# Patient Record
Sex: FEMALE | Race: OTHER | NOT HISPANIC OR LATINO
[De-identification: names, ages, dates, MRNs, and addresses within clinical notes are randomized per-mention and may not be internally consistent; named-entity substitution may affect disease eponyms.]

---

## 2019-07-25 ENCOUNTER — RECORD ABSTRACTING (OUTPATIENT)
Age: 84
End: 2019-07-25

## 2019-07-25 DIAGNOSIS — C18.9 MALIGNANT NEOPLASM OF COLON, UNSPECIFIED: ICD-10-CM

## 2019-07-25 DIAGNOSIS — I63.9 CEREBRAL INFARCTION, UNSPECIFIED: ICD-10-CM

## 2019-07-25 DIAGNOSIS — K21.9 GASTRO-ESOPHAGEAL REFLUX DISEASE W/OUT ESOPHAGITIS: ICD-10-CM

## 2019-07-25 DIAGNOSIS — E66.9 OBESITY, UNSPECIFIED: ICD-10-CM

## 2019-08-02 ENCOUNTER — APPOINTMENT (OUTPATIENT)
Dept: CARDIOLOGY | Facility: CLINIC | Age: 84
End: 2019-08-02
Payer: MEDICARE

## 2019-08-02 ENCOUNTER — NON-APPOINTMENT (OUTPATIENT)
Age: 84
End: 2019-08-02

## 2019-08-02 VITALS
DIASTOLIC BLOOD PRESSURE: 73 MMHG | HEIGHT: 60 IN | OXYGEN SATURATION: 97 % | BODY MASS INDEX: 21.64 KG/M2 | RESPIRATION RATE: 14 BRPM | HEART RATE: 61 BPM | SYSTOLIC BLOOD PRESSURE: 151 MMHG | WEIGHT: 110.23 LBS

## 2019-08-02 DIAGNOSIS — I21.4 NON-ST ELEVATION (NSTEMI) MYOCARDIAL INFARCTION: ICD-10-CM

## 2019-08-02 DIAGNOSIS — I50.30 UNSPECIFIED DIASTOLIC (CONGESTIVE) HEART FAILURE: ICD-10-CM

## 2019-08-02 DIAGNOSIS — Z86.79 PERSONAL HISTORY OF OTHER DISEASES OF THE CIRCULATORY SYSTEM: ICD-10-CM

## 2019-08-02 DIAGNOSIS — Z87.448 PERSONAL HISTORY OF OTHER DISEASES OF URINARY SYSTEM: ICD-10-CM

## 2019-08-02 DIAGNOSIS — I10 ESSENTIAL (PRIMARY) HYPERTENSION: ICD-10-CM

## 2019-08-02 PROCEDURE — 99204 OFFICE O/P NEW MOD 45 MIN: CPT

## 2019-08-02 PROCEDURE — 93000 ELECTROCARDIOGRAM COMPLETE: CPT

## 2019-08-02 RX ORDER — CALCITRIOL 0.25 UG/1
0.25 CAPSULE, LIQUID FILLED ORAL
Refills: 0 | Status: ACTIVE | COMMUNITY
Start: 2019-08-02

## 2019-08-02 RX ORDER — GLIPIZIDE 2.5 MG/1
2.5 TABLET, FILM COATED, EXTENDED RELEASE ORAL
Refills: 0 | Status: ACTIVE | COMMUNITY
Start: 2019-08-02

## 2019-08-02 RX ORDER — SODIUM BICARBONATE 650 MG/1
650 TABLET ORAL 3 TIMES DAILY
Refills: 0 | Status: ACTIVE | COMMUNITY
Start: 2019-08-02

## 2019-08-02 RX ORDER — CLOPIDOGREL 75 MG/1
75 TABLET, FILM COATED ORAL
Refills: 0 | Status: ACTIVE | COMMUNITY
Start: 2019-08-02

## 2019-08-02 RX ORDER — AMLODIPINE BESYLATE 5 MG/1
5 TABLET ORAL
Refills: 0 | Status: ACTIVE | COMMUNITY
Start: 2019-08-02

## 2019-08-02 RX ORDER — METOPROLOL SUCCINATE 25 MG/1
25 TABLET, EXTENDED RELEASE ORAL DAILY
Refills: 3 | Status: ACTIVE | COMMUNITY
Start: 2019-08-02

## 2019-08-02 RX ORDER — FUROSEMIDE 40 MG/1
40 TABLET ORAL
Qty: 30 | Refills: 0 | Status: ACTIVE | COMMUNITY
Start: 2019-08-02

## 2019-08-02 RX ORDER — ROSUVASTATIN CALCIUM 5 MG/1
5 TABLET, FILM COATED ORAL DAILY
Qty: 30 | Refills: 3 | Status: ACTIVE | COMMUNITY
Start: 2019-08-02

## 2019-08-02 RX ORDER — CALCIUM ACETATE 667 MG/1
667 CAPSULE ORAL
Qty: 90 | Refills: 0 | Status: ACTIVE | COMMUNITY
Start: 2019-08-02

## 2019-08-02 NOTE — HISTORY OF PRESENT ILLNESS
[FreeTextEntry1] : 87 year old woman, accompanied by her daughter, who comes in to establish care. Was hospitalized in May 2019 at [facility] for NSTEMI, Tn was high and down trended, medically managed. Had TTE at that time that showed preserved EF. Started on HD and now has permacath in left chest wall. 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 No complaints\par Sees EP doctor regularly but needed general cards followup

## 2019-08-02 NOTE — PHYSICAL EXAM
[General Appearance - Well Developed] : well developed [Normal Appearance] : normal appearance [Well Groomed] : well groomed [General Appearance - Well Nourished] : well nourished [No Deformities] : no deformities [General Appearance - In No Acute Distress] : no acute distress [Normal Conjunctiva] : the conjunctiva exhibited no abnormalities [Eyelids - No Xanthelasma] : the eyelids demonstrated no xanthelasmas [Normal Oral Mucosa] : normal oral mucosa [No Oral Pallor] : no oral pallor [No Oral Cyanosis] : no oral cyanosis [Normal Jugular Venous A Waves Present] : normal jugular venous A waves present [Normal Jugular Venous V Waves Present] : normal jugular venous V waves present [No Jugular Venous Vivar A Waves] : no jugular venous vivar A waves [Heart Rate And Rhythm] : heart rate and rhythm were normal [Heart Sounds] : normal S1 and S2 [Murmurs] : no murmurs present [Respiration, Rhythm And Depth] : normal respiratory rhythm and effort [Exaggerated Use Of Accessory Muscles For Inspiration] : no accessory muscle use [Auscultation Breath Sounds / Voice Sounds] : lungs were clear to auscultation bilaterally [Abdomen Soft] : soft [Abdomen Tenderness] : non-tender [Abdomen Mass (___ Cm)] : no abdominal mass palpated [Abnormal Walk] : normal gait [Gait - Sufficient For Exercise Testing] : the gait was sufficient for exercise testing [Skin Color & Pigmentation] : normal skin color and pigmentation [] : no rash [No Venous Stasis] : no venous stasis [Skin Lesions] : no skin lesions [No Skin Ulcers] : no skin ulcer [No Xanthoma] : no  xanthoma was observed

## 2019-08-02 NOTE — REASON FOR VISIT
[Initial Evaluation] : an initial evaluation of [FreeTextEntry2] : HFpEF (EF 50-55%), HTN HLD HD NSTEMI in May PPM in place

## 2019-08-02 NOTE — DISCUSSION/SUMMARY
[FreeTextEntry1] : 87 year old woman with history HTN HLD recent NSTEMI and HFpEF here to establish care. No complaints\par EKG reviewed and normal\par 1. HFpEF- COnt Lasix daily, on Toprol 25 mg daily. CHD per renal\par Continue present meds\par 2. HTN- On Norvasc 5 mg daily and Toprol\par 3. HLD- Crestor, continue present meds\par 4. FU in 6 months

## 2020-02-07 ENCOUNTER — APPOINTMENT (OUTPATIENT)
Dept: CARDIOLOGY | Facility: CLINIC | Age: 85
End: 2020-02-07

## 2020-11-16 ENCOUNTER — INPATIENT (INPATIENT)
Facility: HOSPITAL | Age: 85
LOS: 7 days | Discharge: HOME HEALTH | End: 2020-11-24
Attending: STUDENT IN AN ORGANIZED HEALTH CARE EDUCATION/TRAINING PROGRAM | Admitting: STUDENT IN AN ORGANIZED HEALTH CARE EDUCATION/TRAINING PROGRAM
Payer: MEDICARE

## 2020-11-16 VITALS
DIASTOLIC BLOOD PRESSURE: 61 MMHG | SYSTOLIC BLOOD PRESSURE: 130 MMHG | HEART RATE: 61 BPM | TEMPERATURE: 98 F | HEIGHT: 61 IN | RESPIRATION RATE: 18 BRPM | OXYGEN SATURATION: 100 %

## 2020-11-16 DIAGNOSIS — Z95.0 PRESENCE OF CARDIAC PACEMAKER: ICD-10-CM

## 2020-11-16 DIAGNOSIS — E87.2 ACIDOSIS: ICD-10-CM

## 2020-11-16 DIAGNOSIS — E87.5 HYPERKALEMIA: ICD-10-CM

## 2020-11-16 DIAGNOSIS — Z90.49 ACQUIRED ABSENCE OF OTHER SPECIFIED PARTS OF DIGESTIVE TRACT: Chronic | ICD-10-CM

## 2020-11-16 DIAGNOSIS — I10 ESSENTIAL (PRIMARY) HYPERTENSION: ICD-10-CM

## 2020-11-16 DIAGNOSIS — Z02.9 ENCOUNTER FOR ADMINISTRATIVE EXAMINATIONS, UNSPECIFIED: ICD-10-CM

## 2020-11-16 DIAGNOSIS — N25.0 RENAL OSTEODYSTROPHY: ICD-10-CM

## 2020-11-16 DIAGNOSIS — N18.6 END STAGE RENAL DISEASE: ICD-10-CM

## 2020-11-16 DIAGNOSIS — D64.9 ANEMIA, UNSPECIFIED: ICD-10-CM

## 2020-11-16 DIAGNOSIS — Z29.9 ENCOUNTER FOR PROPHYLACTIC MEASURES, UNSPECIFIED: ICD-10-CM

## 2020-11-16 DIAGNOSIS — Z95.0 PRESENCE OF CARDIAC PACEMAKER: Chronic | ICD-10-CM

## 2020-11-16 DIAGNOSIS — R19.7 DIARRHEA, UNSPECIFIED: ICD-10-CM

## 2020-11-16 LAB
ALBUMIN SERPL ELPH-MCNC: 4.6 G/DL — SIGNIFICANT CHANGE UP (ref 3.3–5)
ALP SERPL-CCNC: 88 U/L — SIGNIFICANT CHANGE UP (ref 40–120)
ALT FLD-CCNC: 66 U/L — HIGH (ref 4–33)
ANION GAP SERPL CALC-SCNC: 15 MMO/L — HIGH (ref 7–14)
ANION GAP SERPL CALC-SCNC: 15 MMO/L — HIGH (ref 7–14)
ANION GAP SERPL CALC-SCNC: 17 MMO/L — HIGH (ref 7–14)
AST SERPL-CCNC: 41 U/L — HIGH (ref 4–32)
BASE EXCESS BLDV CALC-SCNC: -14.1 MMOL/L — SIGNIFICANT CHANGE UP
BASE EXCESS BLDV CALC-SCNC: -14.6 MMOL/L — SIGNIFICANT CHANGE UP
BASOPHILS # BLD AUTO: 0.03 K/UL — SIGNIFICANT CHANGE UP (ref 0–0.2)
BASOPHILS NFR BLD AUTO: 0.8 % — SIGNIFICANT CHANGE UP (ref 0–2)
BILIRUB SERPL-MCNC: 0.4 MG/DL — SIGNIFICANT CHANGE UP (ref 0.2–1.2)
BLD GP AB SCN SERPL QL: NEGATIVE — SIGNIFICANT CHANGE UP
BLOOD GAS VENOUS - CREATININE: 7.81 MG/DL — HIGH (ref 0.5–1.3)
BLOOD GAS VENOUS - CREATININE: SIGNIFICANT CHANGE UP MG/DL (ref 0.5–1.3)
BUN SERPL-MCNC: 104 MG/DL — HIGH (ref 7–23)
BUN SERPL-MCNC: 98 MG/DL — HIGH (ref 7–23)
BUN SERPL-MCNC: 99 MG/DL — HIGH (ref 7–23)
CALCIUM SERPL-MCNC: 8.7 MG/DL — SIGNIFICANT CHANGE UP (ref 8.4–10.5)
CALCIUM SERPL-MCNC: 8.7 MG/DL — SIGNIFICANT CHANGE UP (ref 8.4–10.5)
CALCIUM SERPL-MCNC: 9 MG/DL — SIGNIFICANT CHANGE UP (ref 8.4–10.5)
CHLORIDE BLDV-SCNC: 114 MMOL/L — HIGH (ref 96–108)
CHLORIDE BLDV-SCNC: 118 MMOL/L — HIGH (ref 96–108)
CHLORIDE SERPL-SCNC: 109 MMOL/L — HIGH (ref 98–107)
CHLORIDE SERPL-SCNC: 113 MMOL/L — HIGH (ref 98–107)
CHLORIDE SERPL-SCNC: 113 MMOL/L — HIGH (ref 98–107)
CO2 SERPL-SCNC: 12 MMOL/L — LOW (ref 22–31)
CO2 SERPL-SCNC: 13 MMOL/L — LOW (ref 22–31)
CO2 SERPL-SCNC: 13 MMOL/L — LOW (ref 22–31)
CREAT SERPL-MCNC: 7.29 MG/DL — HIGH (ref 0.5–1.3)
CREAT SERPL-MCNC: 7.55 MG/DL — HIGH (ref 0.5–1.3)
CREAT SERPL-MCNC: 7.98 MG/DL — HIGH (ref 0.5–1.3)
EOSINOPHIL # BLD AUTO: 0.05 K/UL — SIGNIFICANT CHANGE UP (ref 0–0.5)
EOSINOPHIL NFR BLD AUTO: 1.3 % — SIGNIFICANT CHANGE UP (ref 0–6)
GAS PNL BLDV: 140 MMOL/L — SIGNIFICANT CHANGE UP (ref 136–146)
GAS PNL BLDV: 141 MMOL/L — SIGNIFICANT CHANGE UP (ref 136–146)
GLUCOSE BLDC GLUCOMTR-MCNC: 94 MG/DL — SIGNIFICANT CHANGE UP (ref 70–99)
GLUCOSE BLDV-MCNC: 132 MG/DL — HIGH (ref 70–99)
GLUCOSE BLDV-MCNC: 96 MG/DL — SIGNIFICANT CHANGE UP (ref 70–99)
GLUCOSE SERPL-MCNC: 101 MG/DL — HIGH (ref 70–99)
GLUCOSE SERPL-MCNC: 105 MG/DL — HIGH (ref 70–99)
GLUCOSE SERPL-MCNC: 135 MG/DL — HIGH (ref 70–99)
HBV SURFACE AG SER-ACNC: NEGATIVE — SIGNIFICANT CHANGE UP
HCO3 BLDV-SCNC: 12 MMOL/L — LOW (ref 20–27)
HCO3 BLDV-SCNC: 13 MMOL/L — LOW (ref 20–27)
HCT VFR BLD CALC: 27.4 % — LOW (ref 34.5–45)
HCT VFR BLDV CALC: 25.5 % — LOW (ref 34.5–45)
HCT VFR BLDV CALC: 30.6 % — LOW (ref 34.5–45)
HGB BLD-MCNC: 9.2 G/DL — LOW (ref 11.5–15.5)
HGB BLDV-MCNC: 8.2 G/DL — LOW (ref 11.5–15.5)
HGB BLDV-MCNC: 9.9 G/DL — LOW (ref 11.5–15.5)
IMM GRANULOCYTES NFR BLD AUTO: 0.3 % — SIGNIFICANT CHANGE UP (ref 0–1.5)
LACTATE BLDV-MCNC: 1.5 MMOL/L — SIGNIFICANT CHANGE UP (ref 0.5–2)
LACTATE BLDV-MCNC: 1.6 MMOL/L — SIGNIFICANT CHANGE UP (ref 0.5–2)
LIDOCAIN IGE QN: 48.2 U/L — SIGNIFICANT CHANGE UP (ref 7–60)
LYMPHOCYTES # BLD AUTO: 0.78 K/UL — LOW (ref 1–3.3)
LYMPHOCYTES # BLD AUTO: 19.5 % — SIGNIFICANT CHANGE UP (ref 13–44)
MAGNESIUM SERPL-MCNC: 2.5 MG/DL — SIGNIFICANT CHANGE UP (ref 1.6–2.6)
MCHC RBC-ENTMCNC: 32.2 PG — SIGNIFICANT CHANGE UP (ref 27–34)
MCHC RBC-ENTMCNC: 33.6 % — SIGNIFICANT CHANGE UP (ref 32–36)
MCV RBC AUTO: 95.8 FL — SIGNIFICANT CHANGE UP (ref 80–100)
MONOCYTES # BLD AUTO: 0.32 K/UL — SIGNIFICANT CHANGE UP (ref 0–0.9)
MONOCYTES NFR BLD AUTO: 8 % — SIGNIFICANT CHANGE UP (ref 2–14)
NEUTROPHILS # BLD AUTO: 2.8 K/UL — SIGNIFICANT CHANGE UP (ref 1.8–7.4)
NEUTROPHILS NFR BLD AUTO: 70.1 % — SIGNIFICANT CHANGE UP (ref 43–77)
NRBC # FLD: 0 K/UL — SIGNIFICANT CHANGE UP (ref 0–0)
PCO2 BLDV: 35 MMHG — LOW (ref 41–51)
PCO2 BLDV: 36 MMHG — LOW (ref 41–51)
PH BLDV: 7.17 PH — CRITICAL LOW (ref 7.32–7.43)
PH BLDV: 7.18 PH — CRITICAL LOW (ref 7.32–7.43)
PHOSPHATE SERPL-MCNC: 6.4 MG/DL — HIGH (ref 2.5–4.5)
PLATELET # BLD AUTO: 117 K/UL — LOW (ref 150–400)
PMV BLD: 13 FL — SIGNIFICANT CHANGE UP (ref 7–13)
PO2 BLDV: 23 MMHG — LOW (ref 35–40)
PO2 BLDV: 25 MMHG — LOW (ref 35–40)
POTASSIUM BLDV-SCNC: 5.1 MMOL/L — HIGH (ref 3.4–4.5)
POTASSIUM BLDV-SCNC: 6.4 MMOL/L — CRITICAL HIGH (ref 3.4–4.5)
POTASSIUM SERPL-MCNC: 5.4 MMOL/L — HIGH (ref 3.5–5.3)
POTASSIUM SERPL-MCNC: 5.4 MMOL/L — HIGH (ref 3.5–5.3)
POTASSIUM SERPL-MCNC: 6.4 MMOL/L — CRITICAL HIGH (ref 3.5–5.3)
POTASSIUM SERPL-SCNC: 5.4 MMOL/L — HIGH (ref 3.5–5.3)
POTASSIUM SERPL-SCNC: 5.4 MMOL/L — HIGH (ref 3.5–5.3)
POTASSIUM SERPL-SCNC: 6.4 MMOL/L — CRITICAL HIGH (ref 3.5–5.3)
PROT SERPL-MCNC: 6.8 G/DL — SIGNIFICANT CHANGE UP (ref 6–8.3)
RBC # BLD: 2.86 M/UL — LOW (ref 3.8–5.2)
RBC # FLD: 13.4 % — SIGNIFICANT CHANGE UP (ref 10.3–14.5)
RH IG SCN BLD-IMP: POSITIVE — SIGNIFICANT CHANGE UP
SAO2 % BLDV: 28.7 % — LOW (ref 60–85)
SAO2 % BLDV: 31.4 % — LOW (ref 60–85)
SARS-COV-2 RNA SPEC QL NAA+PROBE: SIGNIFICANT CHANGE UP
SODIUM SERPL-SCNC: 139 MMOL/L — SIGNIFICANT CHANGE UP (ref 135–145)
SODIUM SERPL-SCNC: 140 MMOL/L — SIGNIFICANT CHANGE UP (ref 135–145)
SODIUM SERPL-SCNC: 141 MMOL/L — SIGNIFICANT CHANGE UP (ref 135–145)
WBC # BLD: 3.99 K/UL — SIGNIFICANT CHANGE UP (ref 3.8–10.5)
WBC # FLD AUTO: 3.99 K/UL — SIGNIFICANT CHANGE UP (ref 3.8–10.5)

## 2020-11-16 PROCEDURE — 71045 X-RAY EXAM CHEST 1 VIEW: CPT | Mod: 26

## 2020-11-16 PROCEDURE — 36410 VNPNXR 3YR/> PHY/QHP DX/THER: CPT

## 2020-11-16 PROCEDURE — 99285 EMERGENCY DEPT VISIT HI MDM: CPT

## 2020-11-16 PROCEDURE — 76937 US GUIDE VASCULAR ACCESS: CPT | Mod: 26

## 2020-11-16 RX ORDER — DEXTROSE 50 % IN WATER 50 %
50 SYRINGE (ML) INTRAVENOUS ONCE
Refills: 0 | Status: COMPLETED | OUTPATIENT
Start: 2020-11-16 | End: 2020-11-16

## 2020-11-16 RX ORDER — CHLORHEXIDINE GLUCONATE 213 G/1000ML
1 SOLUTION TOPICAL DAILY
Refills: 0 | Status: DISCONTINUED | OUTPATIENT
Start: 2020-11-16 | End: 2020-11-24

## 2020-11-16 RX ORDER — METOPROLOL TARTRATE 50 MG
25 TABLET ORAL DAILY
Refills: 0 | Status: DISCONTINUED | OUTPATIENT
Start: 2020-11-16 | End: 2020-11-19

## 2020-11-16 RX ORDER — ATORVASTATIN CALCIUM 80 MG/1
20 TABLET, FILM COATED ORAL AT BEDTIME
Refills: 0 | Status: DISCONTINUED | OUTPATIENT
Start: 2020-11-16 | End: 2020-11-24

## 2020-11-16 RX ORDER — HEPARIN SODIUM 5000 [USP'U]/ML
5000 INJECTION INTRAVENOUS; SUBCUTANEOUS EVERY 12 HOURS
Refills: 0 | Status: DISCONTINUED | OUTPATIENT
Start: 2020-11-16 | End: 2020-11-17

## 2020-11-16 RX ORDER — CALCIUM GLUCONATE 100 MG/ML
2 VIAL (ML) INTRAVENOUS ONCE
Refills: 0 | Status: COMPLETED | OUTPATIENT
Start: 2020-11-16 | End: 2020-11-16

## 2020-11-16 RX ORDER — ROSUVASTATIN CALCIUM 5 MG/1
1 TABLET ORAL
Qty: 0 | Refills: 0 | DISCHARGE

## 2020-11-16 RX ORDER — SODIUM CHLORIDE 9 MG/ML
3 INJECTION INTRAMUSCULAR; INTRAVENOUS; SUBCUTANEOUS EVERY 8 HOURS
Refills: 0 | Status: DISCONTINUED | OUTPATIENT
Start: 2020-11-16 | End: 2020-11-24

## 2020-11-16 RX ORDER — SODIUM CHLORIDE 9 MG/ML
1000 INJECTION INTRAMUSCULAR; INTRAVENOUS; SUBCUTANEOUS ONCE
Refills: 0 | Status: COMPLETED | OUTPATIENT
Start: 2020-11-16 | End: 2020-11-16

## 2020-11-16 RX ORDER — INSULIN HUMAN 100 [IU]/ML
5 INJECTION, SOLUTION SUBCUTANEOUS ONCE
Refills: 0 | Status: COMPLETED | OUTPATIENT
Start: 2020-11-16 | End: 2020-11-16

## 2020-11-16 RX ORDER — SODIUM ZIRCONIUM CYCLOSILICATE 10 G/10G
10 POWDER, FOR SUSPENSION ORAL ONCE
Refills: 0 | Status: COMPLETED | OUTPATIENT
Start: 2020-11-16 | End: 2020-11-16

## 2020-11-16 RX ORDER — ONDANSETRON 8 MG/1
4 TABLET, FILM COATED ORAL ONCE
Refills: 0 | Status: COMPLETED | OUTPATIENT
Start: 2020-11-16 | End: 2020-11-16

## 2020-11-16 RX ORDER — CALCIUM ACETATE 667 MG
667 TABLET ORAL
Refills: 0 | Status: DISCONTINUED | OUTPATIENT
Start: 2020-11-16 | End: 2020-11-24

## 2020-11-16 RX ADMIN — INSULIN HUMAN 5 UNIT(S): 100 INJECTION, SOLUTION SUBCUTANEOUS at 16:47

## 2020-11-16 RX ADMIN — ONDANSETRON 4 MILLIGRAM(S): 8 TABLET, FILM COATED ORAL at 15:20

## 2020-11-16 RX ADMIN — Medication 200 GRAM(S): at 15:23

## 2020-11-16 RX ADMIN — Medication 50 MILLILITER(S): at 16:47

## 2020-11-16 RX ADMIN — SODIUM CHLORIDE 1000 MILLILITER(S): 9 INJECTION INTRAMUSCULAR; INTRAVENOUS; SUBCUTANEOUS at 15:20

## 2020-11-16 RX ADMIN — SODIUM ZIRCONIUM CYCLOSILICATE 10 GRAM(S): 10 POWDER, FOR SUSPENSION ORAL at 16:55

## 2020-11-16 NOTE — H&P ADULT - ASSESSMENT
89 y/o F with hx of HTN, colon ca s/p hemicolectomy, ESRD on HD via permacath, presents with nausea and vomiting and diarrhea x few days. admitted for missed HD, hyperkalemia

## 2020-11-16 NOTE — H&P ADULT - PROBLEM SELECTOR PLAN 4
check PTH  start phosphate binders with meals Alistair had loose BM/diarrhea outpatient. No further episodes. Monitor BM  If patient continues to have more episodes, then consider stool study, ova parasite Patient had loose BM/diarrhea outpatient. No further episodes. Monitor BM  If patient continues to have more episodes, then consider stool study, ova parasite  Lactose free diet

## 2020-11-16 NOTE — H&P ADULT - RS GEN PE MLT RESP DETAILS PC
breath sounds equal/respirations non-labored/airway patent/clear to auscultation bilaterally/good air movement good air movement/no chest wall tenderness/airway patent/clear to auscultation bilaterally/respirations non-labored/breath sounds equal

## 2020-11-16 NOTE — CONSULT NOTE ADULT - PROBLEM SELECTOR RECOMMENDATION 2
On admission K: 6.4 s/p medical management down to 5.4  will go for HD tonight.- continue to monitor BMP

## 2020-11-16 NOTE — H&P ADULT - PROBLEM SELECTOR PLAN 10
1.  Name of PCP: Dr. Bowen  2.  PCP Contacted on Admission: [ ] Y    [ ] N    3.  PCP contacted at Discharge: [ ] Y    [ ] N    [ ] N/A  4.  Post-Discharge Appointment Date and Location:  5.  Summary of Handoff given to PCP: 1.  Name of PCP:   Dr. Covarrubias (Renal)  Dr. Ethan Bowen (PMD)  2.  PCP Contacted on Admission: [ ] Y    [ ] N    3.  PCP contacted at Discharge: [ ] Y    [ ] N    [ ] N/A  4.  Post-Discharge Appointment Date and Location:  5.  Summary of Handoff given to PCP:

## 2020-11-16 NOTE — H&P ADULT - NSICDXPASTSURGICALHX_GEN_ALL_CORE_FT
PAST SURGICAL HISTORY:  Hx of hysterectomy      PAST SURGICAL HISTORY:  H/O hemicolectomy     Hx of hysterectomy     Pacemaker

## 2020-11-16 NOTE — H&P ADULT - NEGATIVE CARDIOVASCULAR SYMPTOMS
no palpitations/no chest pain/no dyspnea on exertion no palpitations/no chest pain/no peripheral edema/no dyspnea on exertion

## 2020-11-16 NOTE — H&P ADULT - ATTENDING COMMENTS
89 y/o female HX of HTN, Colon CA, S/P Hemicolectomy, ESRD on HD via Perma Cath, + PPM, High Cholesterol, Anemia, pt c/o + N/V, + diarrhea at home,  decreased po intake, last BM yesterday morning, no more diarrhea reported, no fever, pt missed HD as well x 2-3 weeks as per daughter, NO CP, NO  SOB, no Cough, no abdominal pain, NO HA, no dizziness, no dysuria, no cough, still making urine on PO Lasix at home, + hyperkalemia on admission , treated by ER, K+ 6.4--->5.4, S/P IV Ca Gluconate, D50 and regular Insulin , Lokelma  10 gm po x 1, pt was seen by Renal tonight, S/P HD tonight,     Vitals: Tem 98, HR 68, /60, RR 19, 100% RA, FS 94,    Na 140, BUN 98, creatinine 7.29, AG 15, HCO3=12, lactate 1.5, COVID 19 PCR  Neg., Hgb 9.2, WBC 3.99, Platelet 117,     Hold Lasix, will check with Renal in AM regarding when to start PO Lasix, PT consult, Renal f/u, fall/aspiration precaution, EP consult for PPM interrogation, DVT Prophylaxis: SQ Heparin, F/U CBC, Platelet 117, F/U BMP, CBC, Vit B12, Folate, Iron Studies, Ferritin, Hep A,B,C profile, Lactose free, Renal Diet, Start Phoslo, Serum PTH intact, UA, TSH, Free T4, HgbA1c, Fructosamine, fasting Lipid, on Lipitor, on Lopressor ER 25 mg QD,     Case D/W Pt and TELE PA,    pt was seen & evaluated  by me, Dr. JUAN Aguilar on 11/17/2020. 87 y/o female HX of HTN, Colon CA, S/P Hemicolectomy, ESRD on HD via Perma Cath, + PPM, High Cholesterol, Anemia, pt c/o + N/V, + diarrhea at home,  decreased po intake, last BM yesterday morning, no more diarrhea reported, no fever, pt missed HD as well x 2-3 weeks as per daughter, NO CP, NO  SOB, no Cough, no abdominal pain, NO HA, no dizziness, no dysuria, no cough, still making urine on PO Lasix at home, + hyperkalemia on admission , treated by ER, K+ 6.4--->5.4, S/P IV Ca Gluconate, D50 and regular Insulin , Lokelma  10 gm po x 1, pt was seen by Renal tonight, S/P HD tonight,     Vitals: Tem 98, HR 68, /60, RR 19, 100% RA, FS 94,    Na 140, BUN 98, creatinine 7.29, AG 15, HCO3=12, lactate 1.5, COVID 19 PCR  Neg., Hgb 9.2, WBC 3.99, Platelet 117,     Hold Lasix, will check with Renal in AM regarding when to start PO Lasix, PT consult, Renal f/u, fall/aspiration precaution, EP consult for PPM interrogation, DVT Prophylaxis: Venodyne,  F/U CBC, Platelet 117, F/U BMP, CBC, Vit B12, Folate, Iron Studies, Ferritin, Hep A,B,C profile, Lactose free, Renal Diet, Start Phoslo, Serum PTH intact, UA, TSH, Free T4, HgbA1c, Fructosamine, fasting Lipid, on Lipitor, on Lopressor ER 25 mg QD,     Case D/W Pt and TELE PA,    pt was seen & evaluated  by me, Dr. JUAN Aguilar on 11/17/2020.

## 2020-11-16 NOTE — ED ADULT NURSE NOTE - INTERVENTIONS DEFINITIONS
Stretcher in lowest position, wheels locked, appropriate side rails in place/Room bathroom lighting operational/Call bell, personal items and telephone within reach/Physically safe environment: no spills, clutter or unnecessary equipment/Non-slip footwear when patient is off stretcher

## 2020-11-16 NOTE — CONSULT NOTE ADULT - PROBLEM SELECTOR RECOMMENDATION 9
Pt. with ESRD on HD three times a week. Last HD was on 2-3 weeks ago via right tunneled cath. Unknown dialysis center -Pt. clinically stable. Labs reviewed and remarkable for hyperkalemia. Will arrange for maintenance HD today. Monitor labs. Patient consented for HD, placed on paper chart.

## 2020-11-16 NOTE — CONSULT NOTE ADULT - PROBLEM SELECTOR RECOMMENDATION 4
patient with HAGMA, CO2: 13 and pH: 7.1 - most likely 2/2 renal failure.   will schedule for HD tonight, continue to monitor.

## 2020-11-16 NOTE — ED PROVIDER NOTE - CARE PLAN
Principal Discharge DX:	Hyperkalemia  Secondary Diagnosis:	Hypertension, unspecified type  Secondary Diagnosis:	ESRD (end stage renal disease) on dialysis

## 2020-11-16 NOTE — ED PROVIDER NOTE - NS ED ROS FT
ROS:  GENERAL: No fever, no chills  EYES: no change in vision  HEENT: no trouble swallowing, no trouble speaking  CARDIAC: no chest pain  PULMONARY: no cough, no shortness of breath  GI: no abdominal pain, + nausea, + vomiting, + diarrhea, no constipation  : No dysuria, no frequency, no change in appearance, or odor of urine  SKIN: no rashes  NEURO: no headache, no weakness  MSK: No joint pain  ~Ramón Bates D.O. -Resident

## 2020-11-16 NOTE — H&P ADULT - NEGATIVE NEUROLOGICAL SYMPTOMS
no weakness/no transient paralysis no syncope/no vertigo/no transient paralysis/no headache/no confusion/no weakness/no tremors

## 2020-11-16 NOTE — CONSULT NOTE ADULT - PROBLEM SELECTOR RECOMMENDATION 6
Patient with hyperphosphatemia --please check PTH   Please start on phosphate binders with meals.  Low phosphorus diet.  Monitor serum phosphorus.      Discussed with attending on call Dr Grace

## 2020-11-16 NOTE — H&P ADULT - NEGATIVE ENMT SYMPTOMS
no tinnitus/no ear pain/no hearing difficulty no hearing difficulty/no vertigo/no dysphagia/no ear pain/no tinnitus/no nose bleeds

## 2020-11-16 NOTE — H&P ADULT - PROBLEM SELECTOR PLAN 6
cont metoprolol  dash diet Monitor H/H  no signs of active bleeding  Anemia panel  Renal to start DELILAH

## 2020-11-16 NOTE — ED PROVIDER NOTE - CLINICAL SUMMARY MEDICAL DECISION MAKING FREE TEXT BOX
87yo f pmhx ESRD on HD pw daughter for n/v/d for last few days. reports she has not had dialysis treatment x 3 weeks because she refuses to go and she doesn't care what will happen to her if she doesn't get hd. concern for missed hd and patient unwilling to undergo hd at this time. discussed with patient and her daughter richmond at length at bedside risks of not undergoing hd and the risk of dying without hd and she declined further hd at this time. would like to have labwork done and fluids, agreed with calcium and will discuss further pending results of initial eval. daughter would like mother to get hd and thinks she is making a mistake but understands her mother has clear decision making and has the right to make her own decisions about her healthcare at this time.

## 2020-11-16 NOTE — H&P ADULT - NEGATIVE RESPIRATORY AND THORAX SYMPTOMS
no dyspnea/no cough/no wheezing no dyspnea/no cough/no hemoptysis/no pleuritic chest pain/no wheezing

## 2020-11-16 NOTE — ED ADULT TRIAGE NOTE - CHIEF COMPLAINT QUOTE
pt with hx of ESRD on HD, c/o n/v/d. denies abd pain/cp/sob. states has not had dialysis treatment x 3 weeks because she refuses to go.

## 2020-11-16 NOTE — ED PROVIDER NOTE - PHYSICAL EXAMINATION
Physical Exam:  Gen: NAD, AOx3, non-toxic appearing  Head: normal appearing  HEENT: normal conjunctiva, oral mucosa dry  Lung:  no respiratory distress, speaking in full sentences, clear to ascultation bilaterally     CV: regular rate and rhythm   Abd: soft, ND, NT  MSK: no visible deformities  Neuro: No focal deficits  Skin: Warm  Psych: normal affect  ~Ramón Bates D.O. -Resident

## 2020-11-16 NOTE — ED PROCEDURE NOTE - ATTENDING CONTRIBUTION TO CARE
ED focused US guided PIV placement [id number].    Indication poor access.      Findings: compressible (    right   )  AC vein.  Using direct US guidance, under clean conditions, a long 20Ga peripheral catheter introduced into vessel.  US performed after procedure, catheter in vein, no complications.     Impression:  successful US guided ( right    ) AC PIV placement.   Dexeus [id number]

## 2020-11-16 NOTE — H&P ADULT - PROBLEM SELECTOR PLAN 9
1.  Name of PCP: Dr. Bowen  2.  PCP Contacted on Admission: [ ] Y    [ ] N    3.  PCP contacted at Discharge: [ ] Y    [ ] N    [ ] N/A  4.  Post-Discharge Appointment Date and Location:  5.  Summary of Handoff given to PCP: hep sc for VTE px Venodyne

## 2020-11-16 NOTE — ED PROVIDER NOTE - PMH
GERD (gastroesophageal reflux disease)    HTN - Hypertension    Malignant neoplasm of colon    Mini stroke1/2012    Obesity

## 2020-11-16 NOTE — ED ADULT NURSE NOTE - OBJECTIVE STATEMENT
Pt presents to rm 14, A&Ox4, ambulatory w/ walker at baseline, pmhx of ESRD on diaylsis via left sided permacath, here for evaluation of N/V/D x1wk. Pt has not received dialysis in 3wks- pt states "I will not receive dialysis ever again they hurt my arm and they are killing me when they do it." Denies chest pain, shortness of breath, palpitations, diaphoresis, headaches, fevers, dizziness, or urinary symptoms at this time. IV established in right arm with a 20G, unable to obtain labs- Dr. Butt aware- states they will place US IV and obtain labs. call bell in reach, warm blanket provided, bed in lowest position, side rails up x2, MD evaluation in progress, pt on telemetry. Will continue to monitor. Linda RN: Pt presents to rm 14, A&Ox4, ambulatory w/ walker at baseline, pmhx of ESRD on diaylsis via left sided permacath, here for evaluation of N/V/D x1wk. Pt has not received dialysis in 3wks- pt states "I will not receive dialysis ever again they hurt my arm and they are killing me when they do it." Denies chest pain, shortness of breath, palpitations, diaphoresis, headaches, fevers, dizziness, or urinary symptoms at this time. IV established in right arm with a 20G, unable to obtain labs- Dr. Butt aware- states they will place US IV and obtain labs. call bell in reach, warm blanket provided, bed in lowest position, side rails up x2, MD evaluation in progress, pt on telemetry. Report endorsed to primary RN Sruthi.

## 2020-11-16 NOTE — ED PROVIDER NOTE - PROGRESS NOTE DETAILS
discussed with patient, now would like hd at this time. discussed with nephrology fellow, will arrange urgent hd, hospitalist paged for admission

## 2020-11-16 NOTE — H&P ADULT - PROBLEM SELECTOR PLAN 3
on HD  REnal following  HD per renal  Social work/case management for HD placement outpatient  Patient may also need vascular work up for possible AV fistula on HD  REnal following  HD per renal  Social work/case management for HD placement outpatient  Patient may also need vascular work up for possible AV fistula  Needs to clarify with HD regarding continuing lasix for the patient

## 2020-11-16 NOTE — CONSULT NOTE ADULT - PROBLEM SELECTOR RECOMMENDATION 5
Patient with anemia in the setting of ESRD. Hemoglobin below target range   Please check iron studies. Monitor hemoglobin. will start on DELILAH

## 2020-11-16 NOTE — H&P ADULT - NEUROLOGICAL DETAILS
alert and oriented x 3/responds to verbal commands/cranial nerves intact responds to pain/alert and oriented x 3/cranial nerves intact/responds to verbal commands

## 2020-11-16 NOTE — H&P ADULT - NSICDXPASTMEDICALHX_GEN_ALL_CORE_FT
PAST MEDICAL HISTORY:  GERD (gastroesophageal reflux disease)     HTN - Hypertension     Malignant neoplasm of colon     Mini stroke1/2012     Obesity

## 2020-11-16 NOTE — H&P ADULT - PROBLEM SELECTOR PLAN 8
1.  Name of PCP: Dr. Bowen  2.  PCP Contacted on Admission: [ ] Y    [ ] N    3.  PCP contacted at Discharge: [ ] Y    [ ] N    [ ] N/A  4.  Post-Discharge Appointment Date and Location:  5.  Summary of Handoff given to PCP: hep sc for VTE px EP in AM for PPM interrogation

## 2020-11-16 NOTE — ED PROVIDER NOTE - ATTENDING CONTRIBUTION TO CARE
88F with pmh HTN, colon CA, ESRD on HD presenting with complaint of nausea, vomiting, diarrhea and missed HD. Last HD session 3 weeks ago. States still makes urine. Patient refusing to continue with dialysis treatment despite daughter's wishes for her to continue with it. Patient stating she understands she can get much worse and possibly die if she does not continue but states she is sick of getting dialysis. States currently feeling well with no nausea and no episodes of vomiting or diarrha since early this morning. Denies fever, chills, cp, sob, dysuria.     GEN: NAD, awake, well appearing  HEENT: NCAT, MMM, normal conjunctiva, perrl  CHEST/LUNGS: Non-tachypneic, CTAB, bilateral breath sounds  CARDIAC: Non-tachycardic, s1s2, normal perfusion, no peripheral edema  ABDOMEN: Soft, NTND, No rebound/guarding  MSK: No joint tenderness, no gross deformity of extremities  SKIN: No rashes, no petechiae, no vesicles  NEURO: CN grossly intact, normal coordination, no focal motor or sensory deficits  PSYCH: Alert, appropriate, cooperative     Patient presenting nausea, vomiting, diarrhea. Patient well appearing, no signs of significant dehydration. Noncompliant with HD and refuses further sessions. Will obtain screening labs for electrolyte disturbance. Symptoms possible 2/2 viral syndrome.

## 2020-11-16 NOTE — ED PROVIDER NOTE - OBJECTIVE STATEMENT
87yo f pmhx HTN, colon ca sp hemicolectomy, ESRD on HD pw daughter for n/v/d for last few days. reports she has not had dialysis treatment x 3 weeks because she refuses to go and she doesn't care what will happen to her if she doesn't get hd. hd through right chest port. Denies recent trauma, fevers, chills, headache, dizziness, dysuria, freq, hematuria, constipation, chest pain, shortness of breath, cough. decreased po in last 3 weeks. diarrhea and vomitus nbnb    [phone] (Dafne)

## 2020-11-16 NOTE — CONSULT NOTE ADULT - ATTENDING COMMENTS
ESRD   Hyperkalemia  PAULINA    Missed HD x 1 month per daughter who I met with patient at bedside today.  Will plan for next HD tomorrow. Stressed the importance of not missing HD to patient. ESRD   Hyperkalemia  PAULINA    Missed HD x 1 month per daughter who I met with patient at bedside today.  Will plan for next HD today. Stressed the importance of not missing HD to patient.

## 2020-11-16 NOTE — H&P ADULT - HISTORY OF PRESENT ILLNESS
87 y/o F with hx of HTN, colon ca s/p hemicolectomy, ESRD on HD via permacath, presents with nausea and vomiting and diarrhea x few days. Patient states she has been having episodes nausea/vomiting. She also had low appetite and has not been eating. Daughter was concerned about her condition and went to check on her. Daughter saw patient had loose BM as well and took her to ED for further eval. Patient has missed about 2-3 weeks of HD because she does not like the HD center. Per daughter, patient has these episodes where she adamantly refuses to go to HD. Denies fever, chills, cough, falls, LOC, chest pain, SOB, abdominal pain, nausea, vomiting, melena, hematochezia, LE edema or dysuria.     Per daughter, Dafne who is a RN at [employer], patient goes to [facility] in [place]. Patient was being worked up for a AV fistula, but she refused to go for further work up. Currently she received HD through permacath which was placed in June 2019. 89 y/o F with hx of HTN, colon ca s/p hemicolectomy, ESRD on HD via permacath, presents with nausea and vomiting and diarrhea x few days. Patient states she has been having episodes nausea/vomiting. She also had low appetite and has not been eating. Daughter was concerned about her condition and went to check on her. Daughter saw patient had loose BM as well and took her to ED for further eval. Patient has missed about 2-3 weeks of HD because she does not like the HD center. Per daughter, patient has these episodes where she adamantly refuses to go to HD. Denies fever, chills, cough, falls, LOC, chest pain, SOB, abdominal pain, nausea, vomiting, melena, hematochezia, LE edema or dysuria.     Per daughter, Dafne who is a RN at [employer], patient goes to [facility] in [place]. Patient was being worked up for a AV fistula, but she refused to go for further work up. Currently she received HD through permacath which was placed in June 2019.     Per patient, last BM was this AM 89 y/o F with hx of HTN, colon ca s/p hemicolectomy, ESRD on HD via permacath, presents with nausea and vomiting and diarrhea x few days. Patient states she has been having episodes nausea/vomiting. She also had low appetite and has not been eating. Daughter was concerned about her condition and went to check on her. Daughter saw patient had loose BM as well and took her to ED for further eval. Patient has missed about 2-3 weeks of HD because she does not like the HD center. Per daughter, patient has these episodes where she adamantly refuses to go to HD. Denies fever, chills, cough, falls, LOC, chest pain, SOB, abdominal pain, nausea, vomiting, melena, hematochezia, LE edema or dysuria.     Per daughter, Dafne who is a RN at [employer], patient goes to [facility] in [place]. Patient was being worked up for a AV fistula, but she refused to go for further work up. Currently she received HD through permacath which was placed in June 2019. Patient also does not stay with one doctor. Last renal doctor she followed up was Dr. Covarrubias.    Per patient, last BM was this AM

## 2020-11-16 NOTE — H&P ADULT - PROBLEM SELECTOR PLAN 5
Monitor H/H  no signs of active bleeding  Anemia panel  Renal to start DELILAH check PTH  start phosphate binders with meals [Start PhosLo]

## 2020-11-16 NOTE — H&P ADULT - EYES
Alert-The patient is alert, awake and responds to voice. The patient is oriented to time, place, and person. The triage nurse is able to obtain subjective information. detailed exam EOMI/conjunctiva clear EOMI/conjunctiva clear/PERRL

## 2020-11-16 NOTE — H&P ADULT - NEGATIVE GENERAL GENITOURINARY SYMPTOMS
no renal colic/no hematuria/no flank pain L/no flank pain R no flank pain L/no flank pain R/no hematuria/no renal colic/no dysuria

## 2020-11-17 DIAGNOSIS — D69.6 THROMBOCYTOPENIA, UNSPECIFIED: ICD-10-CM

## 2020-11-17 LAB
ANION GAP SERPL CALC-SCNC: 17 MMO/L — HIGH (ref 7–14)
ANISOCYTOSIS BLD QL: SLIGHT — SIGNIFICANT CHANGE UP
APPEARANCE UR: SIGNIFICANT CHANGE UP
APTT BLD: 29 SEC — SIGNIFICANT CHANGE UP (ref 27–36.3)
BACTERIA # UR AUTO: HIGH
BASE EXCESS BLDV CALC-SCNC: -3.5 MMOL/L — SIGNIFICANT CHANGE UP
BASOPHILS # BLD AUTO: 0.02 K/UL — SIGNIFICANT CHANGE UP (ref 0–0.2)
BASOPHILS NFR BLD AUTO: 0.5 % — SIGNIFICANT CHANGE UP (ref 0–2)
BILIRUB UR-MCNC: NEGATIVE — SIGNIFICANT CHANGE UP
BLOOD GAS VENOUS - CREATININE: 4.23 MG/DL — HIGH (ref 0.5–1.3)
BLOOD UR QL VISUAL: SIGNIFICANT CHANGE UP
BUN SERPL-MCNC: 40 MG/DL — HIGH (ref 7–23)
CALCIUM SERPL-MCNC: 8.6 MG/DL — SIGNIFICANT CHANGE UP (ref 8.4–10.5)
CHLORIDE BLDV-SCNC: 108 MMOL/L — SIGNIFICANT CHANGE UP (ref 96–108)
CHLORIDE SERPL-SCNC: 104 MMOL/L — SIGNIFICANT CHANGE UP (ref 98–107)
CHOLEST SERPL-MCNC: 106 MG/DL — LOW (ref 120–199)
CO2 SERPL-SCNC: 19 MMOL/L — LOW (ref 22–31)
COLOR SPEC: YELLOW — SIGNIFICANT CHANGE UP
CREAT SERPL-MCNC: 4.07 MG/DL — HIGH (ref 0.5–1.3)
DIRECT LDL: 69 MG/DL — SIGNIFICANT CHANGE UP
EOSINOPHIL # BLD AUTO: 0.1 K/UL — SIGNIFICANT CHANGE UP (ref 0–0.5)
EOSINOPHIL NFR BLD AUTO: 2.3 % — SIGNIFICANT CHANGE UP (ref 0–6)
FERRITIN SERPL-MCNC: 843.8 NG/ML — HIGH (ref 15–150)
FOLATE SERPL-MCNC: > 20 NG/ML — HIGH (ref 4.7–20)
GAS PNL BLDV: 138 MMOL/L — SIGNIFICANT CHANGE UP (ref 136–146)
GLUCOSE BLDC GLUCOMTR-MCNC: 112 MG/DL — HIGH (ref 70–99)
GLUCOSE BLDC GLUCOMTR-MCNC: 122 MG/DL — HIGH (ref 70–99)
GLUCOSE BLDC GLUCOMTR-MCNC: 124 MG/DL — HIGH (ref 70–99)
GLUCOSE BLDC GLUCOMTR-MCNC: 158 MG/DL — HIGH (ref 70–99)
GLUCOSE BLDC GLUCOMTR-MCNC: 59 MG/DL — LOW (ref 70–99)
GLUCOSE BLDC GLUCOMTR-MCNC: 60 MG/DL — LOW (ref 70–99)
GLUCOSE BLDC GLUCOMTR-MCNC: 97 MG/DL — SIGNIFICANT CHANGE UP (ref 70–99)
GLUCOSE BLDV-MCNC: 51 MG/DL — CRITICAL LOW (ref 70–99)
GLUCOSE SERPL-MCNC: 102 MG/DL — HIGH (ref 70–99)
GLUCOSE SERPL-MCNC: 52 MG/DL — CRITICAL LOW (ref 70–99)
GLUCOSE UR-MCNC: 70 — SIGNIFICANT CHANGE UP
HBA1C BLD-MCNC: 5.6 % — SIGNIFICANT CHANGE UP (ref 4–5.6)
HCO3 BLDV-SCNC: 21 MMOL/L — SIGNIFICANT CHANGE UP (ref 20–27)
HCT VFR BLD CALC: 22.2 % — LOW (ref 34.5–45)
HCT VFR BLDV CALC: 25.8 % — LOW (ref 34.5–45)
HDLC SERPL-MCNC: 29 MG/DL — LOW (ref 45–65)
HGB BLD-MCNC: 7.8 G/DL — LOW (ref 11.5–15.5)
HGB BLDV-MCNC: 8.3 G/DL — LOW (ref 11.5–15.5)
HYALINE CASTS # UR AUTO: SIGNIFICANT CHANGE UP
IMM GRANULOCYTES NFR BLD AUTO: 0.9 % — SIGNIFICANT CHANGE UP (ref 0–1.5)
INR BLD: 1.13 — SIGNIFICANT CHANGE UP (ref 0.88–1.16)
IRON SATN MFR SERPL: 145 UG/DL — SIGNIFICANT CHANGE UP (ref 140–530)
IRON SATN MFR SERPL: 61 UG/DL — SIGNIFICANT CHANGE UP (ref 30–160)
KETONES UR-MCNC: NEGATIVE — SIGNIFICANT CHANGE UP
LACTATE BLDV-MCNC: 1.9 MMOL/L — SIGNIFICANT CHANGE UP (ref 0.5–2)
LEUKOCYTE ESTERASE UR-ACNC: SIGNIFICANT CHANGE UP
LG PLATELETS BLD QL AUTO: SLIGHT — SIGNIFICANT CHANGE UP
LYMPHOCYTES # BLD AUTO: 0.87 K/UL — LOW (ref 1–3.3)
LYMPHOCYTES # BLD AUTO: 19.8 % — SIGNIFICANT CHANGE UP (ref 13–44)
MACROCYTES BLD QL: SLIGHT — SIGNIFICANT CHANGE UP
MAGNESIUM SERPL-MCNC: 2.1 MG/DL — SIGNIFICANT CHANGE UP (ref 1.6–2.6)
MANUAL SMEAR VERIFICATION: SIGNIFICANT CHANGE UP
MCHC RBC-ENTMCNC: 32.2 PG — SIGNIFICANT CHANGE UP (ref 27–34)
MCHC RBC-ENTMCNC: 35.1 % — SIGNIFICANT CHANGE UP (ref 32–36)
MCV RBC AUTO: 91.7 FL — SIGNIFICANT CHANGE UP (ref 80–100)
MONOCYTES # BLD AUTO: 0.55 K/UL — SIGNIFICANT CHANGE UP (ref 0–0.9)
MONOCYTES NFR BLD AUTO: 12.5 % — SIGNIFICANT CHANGE UP (ref 2–14)
MUCOUS THREADS # UR AUTO: SIGNIFICANT CHANGE UP
NEUTROPHILS # BLD AUTO: 2.81 K/UL — SIGNIFICANT CHANGE UP (ref 1.8–7.4)
NEUTROPHILS NFR BLD AUTO: 64 % — SIGNIFICANT CHANGE UP (ref 43–77)
NITRITE UR-MCNC: NEGATIVE — SIGNIFICANT CHANGE UP
NRBC # FLD: 0 K/UL — SIGNIFICANT CHANGE UP (ref 0–0)
PCO2 BLDV: 34 MMHG — LOW (ref 41–51)
PH BLDV: 7.4 PH — SIGNIFICANT CHANGE UP (ref 7.32–7.43)
PH UR: 6 — SIGNIFICANT CHANGE UP (ref 5–8)
PHOSPHATE SERPL-MCNC: 3.8 MG/DL — SIGNIFICANT CHANGE UP (ref 2.5–4.5)
PLATELET # BLD AUTO: 68 K/UL — LOW (ref 150–400)
PLATELET COUNT - ESTIMATE: SIGNIFICANT CHANGE UP
PMV BLD: 13.5 FL — HIGH (ref 7–13)
PO2 BLDV: 34 MMHG — LOW (ref 35–40)
POIKILOCYTOSIS BLD QL AUTO: SLIGHT — SIGNIFICANT CHANGE UP
POLYCHROMASIA BLD QL SMEAR: SLIGHT — SIGNIFICANT CHANGE UP
POTASSIUM BLDV-SCNC: 4 MMOL/L — SIGNIFICANT CHANGE UP (ref 3.4–4.5)
POTASSIUM SERPL-MCNC: 4.3 MMOL/L — SIGNIFICANT CHANGE UP (ref 3.5–5.3)
POTASSIUM SERPL-SCNC: 4.3 MMOL/L — SIGNIFICANT CHANGE UP (ref 3.5–5.3)
PROT UR-MCNC: 50 — SIGNIFICANT CHANGE UP
PROTHROM AB SERPL-ACNC: 12.8 SEC — SIGNIFICANT CHANGE UP (ref 10.6–13.6)
PTH-INTACT SERPL-MCNC: 883.8 PG/ML — HIGH (ref 15–65)
RBC # BLD: 2.42 M/UL — LOW (ref 3.8–5.2)
RBC # FLD: 13.2 % — SIGNIFICANT CHANGE UP (ref 10.3–14.5)
RBC CASTS # UR COMP ASSIST: HIGH (ref 0–?)
RETICS #: 81 K/UL — SIGNIFICANT CHANGE UP (ref 25–125)
RETICS/RBC NFR: 3.3 % — HIGH (ref 0.5–2.5)
SAO2 % BLDV: 66.2 % — SIGNIFICANT CHANGE UP (ref 60–85)
SODIUM SERPL-SCNC: 140 MMOL/L — SIGNIFICANT CHANGE UP (ref 135–145)
SP GR SPEC: 1.01 — SIGNIFICANT CHANGE UP (ref 1–1.04)
SQUAMOUS # UR AUTO: SIGNIFICANT CHANGE UP
T4 FREE SERPL-MCNC: 1.36 NG/DL — SIGNIFICANT CHANGE UP (ref 0.9–1.8)
TRIGL SERPL-MCNC: 120 MG/DL — SIGNIFICANT CHANGE UP (ref 10–149)
TSH SERPL-MCNC: 2.85 UIU/ML — SIGNIFICANT CHANGE UP (ref 0.27–4.2)
UIBC SERPL-MCNC: 84.1 UG/DL — LOW (ref 110–370)
UROBILINOGEN FLD QL: NORMAL — SIGNIFICANT CHANGE UP
VIT B12 SERPL-MCNC: 1132 PG/ML — HIGH (ref 200–900)
WBC # BLD: 4.39 K/UL — SIGNIFICANT CHANGE UP (ref 3.8–10.5)
WBC # FLD AUTO: 4.39 K/UL — SIGNIFICANT CHANGE UP (ref 3.8–10.5)
WBC UR QL: HIGH (ref 0–?)

## 2020-11-17 PROCEDURE — 99223 1ST HOSP IP/OBS HIGH 75: CPT

## 2020-11-17 PROCEDURE — 93280 PM DEVICE PROGR EVAL DUAL: CPT | Mod: 26

## 2020-11-17 PROCEDURE — 99223 1ST HOSP IP/OBS HIGH 75: CPT | Mod: GC

## 2020-11-17 PROCEDURE — 12345: CPT | Mod: NC

## 2020-11-17 RX ORDER — GLUCAGON INJECTION, SOLUTION 0.5 MG/.1ML
1 INJECTION, SOLUTION SUBCUTANEOUS ONCE
Refills: 0 | Status: DISCONTINUED | OUTPATIENT
Start: 2020-11-17 | End: 2020-11-23

## 2020-11-17 RX ORDER — DEXTROSE 50 % IN WATER 50 %
25 SYRINGE (ML) INTRAVENOUS ONCE
Refills: 0 | Status: DISCONTINUED | OUTPATIENT
Start: 2020-11-17 | End: 2020-11-23

## 2020-11-17 RX ORDER — DEXTROSE 50 % IN WATER 50 %
15 SYRINGE (ML) INTRAVENOUS ONCE
Refills: 0 | Status: DISCONTINUED | OUTPATIENT
Start: 2020-11-17 | End: 2020-11-23

## 2020-11-17 RX ORDER — DEXTROSE 50 % IN WATER 50 %
15 SYRINGE (ML) INTRAVENOUS ONCE
Refills: 0 | Status: COMPLETED | OUTPATIENT
Start: 2020-11-17 | End: 2020-11-17

## 2020-11-17 RX ORDER — INFLUENZA VIRUS VACCINE 15; 15; 15; 15 UG/.5ML; UG/.5ML; UG/.5ML; UG/.5ML
0.5 SUSPENSION INTRAMUSCULAR ONCE
Refills: 0 | Status: COMPLETED | OUTPATIENT
Start: 2020-11-17 | End: 2020-11-17

## 2020-11-17 RX ORDER — DEXTROSE 50 % IN WATER 50 %
12.5 SYRINGE (ML) INTRAVENOUS ONCE
Refills: 0 | Status: DISCONTINUED | OUTPATIENT
Start: 2020-11-17 | End: 2020-11-23

## 2020-11-17 RX ORDER — DEXTROSE 50 % IN WATER 50 %
25 SYRINGE (ML) INTRAVENOUS ONCE
Refills: 0 | Status: DISCONTINUED | OUTPATIENT
Start: 2020-11-17 | End: 2020-11-24

## 2020-11-17 RX ADMIN — SODIUM CHLORIDE 3 MILLILITER(S): 9 INJECTION INTRAMUSCULAR; INTRAVENOUS; SUBCUTANEOUS at 12:26

## 2020-11-17 RX ADMIN — Medication 30 MILLILITER(S): at 02:03

## 2020-11-17 RX ADMIN — Medication 15 GRAM(S): at 06:09

## 2020-11-17 RX ADMIN — SODIUM CHLORIDE 3 MILLILITER(S): 9 INJECTION INTRAMUSCULAR; INTRAVENOUS; SUBCUTANEOUS at 21:06

## 2020-11-17 RX ADMIN — HEPARIN SODIUM 5000 UNIT(S): 5000 INJECTION INTRAVENOUS; SUBCUTANEOUS at 05:11

## 2020-11-17 RX ADMIN — Medication 667 MILLIGRAM(S): at 08:42

## 2020-11-17 RX ADMIN — CHLORHEXIDINE GLUCONATE 1 APPLICATION(S): 213 SOLUTION TOPICAL at 12:27

## 2020-11-17 RX ADMIN — SODIUM CHLORIDE 3 MILLILITER(S): 9 INJECTION INTRAMUSCULAR; INTRAVENOUS; SUBCUTANEOUS at 01:26

## 2020-11-17 RX ADMIN — CHLORHEXIDINE GLUCONATE 1 APPLICATION(S): 213 SOLUTION TOPICAL at 01:26

## 2020-11-17 RX ADMIN — SODIUM CHLORIDE 3 MILLILITER(S): 9 INJECTION INTRAMUSCULAR; INTRAVENOUS; SUBCUTANEOUS at 05:08

## 2020-11-17 RX ADMIN — ATORVASTATIN CALCIUM 20 MILLIGRAM(S): 80 TABLET, FILM COATED ORAL at 21:06

## 2020-11-17 NOTE — PROGRESS NOTE ADULT - PROBLEM SELECTOR PLAN 1
due to missed HD   Hyperkalemia resolved   S/P IV Ca Gluconate, D50 and regular Insulin , Lokelma  10 gm po x 1  Received HD   monitor BMP s/p HD.  Renal consult appreciated due to missed HD As per daughter pt missed HD x  1 month   Hyperkalemia resolved   S/P IV Ca Gluconate, D50 and regular Insulin , Lokelma  10 gm po x 1  Received HD   monitor BMP s/p HD.  Renal consult appreciated

## 2020-11-17 NOTE — PROVIDER CONTACT NOTE (CRITICAL VALUE NOTIFICATION) - BACKGROUND
Patient admitted for hyperkalemia and ESRD. Patient had nausea, vomiting, and diarrhea the past few days.

## 2020-11-17 NOTE — PROGRESS NOTE ADULT - PROBLEM SELECTOR PLAN 5
Likely Anemia of chronic disease  Monitor H/H  no signs of active bleeding  FU Renal re- DELILAH Likely Anemia of chronic disease  Monitor H/H  no signs of active bleeding  Plan for  DELILAH as per Renal

## 2020-11-17 NOTE — PROGRESS NOTE ADULT - PROBLEM SELECTOR PLAN 6
Platelets dropped from 117 to 68   No signs of active bleeding   continue to monitor Platelets dropped from 117 to 68   No signs of active bleeding   continue to monitor  DW Daughter Dafne who is nurse at [employer]- Daughter to obtain outpt CBC

## 2020-11-17 NOTE — PROGRESS NOTE ADULT - PROBLEM SELECTOR PLAN 10
1.  Name of PCP:   Dr. Covarrubias (Renal)  Dr. Ethan Bowen (PMD)  2.  PCP Contacted on Admission: [ ] Y    [ ] N    3.  PCP contacted at Discharge: [ ] Y    [ ] N    [ ] N/A  4.  Post-Discharge Appointment Date and Location:  5.  Summary of Handoff given to PCP:

## 2020-11-17 NOTE — CHART NOTE - NSCHARTNOTEFT_GEN_A_CORE
Pt on Lasix 80mg PO QD at home.  Discussed with Nephrology, Dr. Obrien, by phone regarding restarting med but pt not making urine.  Also noted to be hypotensive today.   Will hold off on restarting Lasix.  If pt does start to make urine, then can reassess restarting Lasix.

## 2020-11-17 NOTE — PHYSICAL THERAPY INITIAL EVALUATION ADULT - ADDITIONAL COMMENTS
Pt. reports owning a rolling walker. Pt. has HHA 3 days/week for 4 hours/day.     Pt. was left semisupine in bed post PT Evaluation, no apparent distress, all lines intact, call bell within reach. Irais MULLEN made aware of pt. status and participation in PT.

## 2020-11-17 NOTE — PROGRESS NOTE ADULT - ASSESSMENT
87 y/o F with hx of HTN, colon ca s/p hemicolectomy, ESRD on HD via permacath, presents with nausea and vomiting and diarrhea x few days. admitted for missed HD, hyperkalemia

## 2020-11-17 NOTE — PROGRESS NOTE ADULT - PROBLEM SELECTOR PLAN 3
on HD  Renal following  HD per renal  Social work/case management for HD placement outpatient  Patient may also need vascular work up for possible AV fistula  Needs to clarify with HD regarding continuing lasix for the patient on HD  Renal following-HD per renal  home med Lasix on hold as pt hypotensive     Social work/case management for HD placement outpatient  Patient may also need vascular work up for possible AV fistula  Needs to clarify with HD regarding continuing lasix for the patient

## 2020-11-17 NOTE — PROGRESS NOTE ADULT - SUBJECTIVE AND OBJECTIVE BOX
PROGRESS NOTE:     Patient is a 88y old  Female who presents with a chief complaint of missed HD, Hyperkalemia, (16 Nov 2020 22:59)      SUBJECTIVE / OVERNIGHT EVENTS:    ADDITIONAL REVIEW OF SYSTEMS:    MEDICATIONS  (STANDING):  atorvastatin 20 milliGRAM(s) Oral at bedtime  calcium acetate 667 milliGRAM(s) Oral three times a day with meals  chlorhexidine 4% Liquid 1 Application(s) Topical daily  dextrose 40% Gel 15 Gram(s) Oral once  dextrose 50% Injectable 25 Gram(s) IV Push once  dextrose 50% Injectable 12.5 Gram(s) IV Push once  dextrose 50% Injectable 25 Gram(s) IV Push once  glucagon  Injectable 1 milliGRAM(s) IntraMuscular once  influenza   Vaccine 0.5 milliLiter(s) IntraMuscular once  metoprolol succinate ER 25 milliGRAM(s) Oral daily  sodium chloride 0.9% lock flush 3 milliLiter(s) IV Push every 8 hours    MEDICATIONS  (PRN):      CAPILLARY BLOOD GLUCOSE      POCT Blood Glucose.: 122 mg/dL (17 Nov 2020 08:00)  POCT Blood Glucose.: 124 mg/dL (17 Nov 2020 06:36)  POCT Blood Glucose.: 112 mg/dL (17 Nov 2020 06:35)  POCT Blood Glucose.: 97 mg/dL (17 Nov 2020 06:22)  POCT Blood Glucose.: 60 mg/dL (17 Nov 2020 05:54)  POCT Blood Glucose.: 59 mg/dL (17 Nov 2020 05:53)  POCT Blood Glucose.: 94 mg/dL (16 Nov 2020 23:53)  POCT Blood Glucose.: 118 mg/dL (16 Nov 2020 18:27)  POCT Blood Glucose.: 120 mg/dL (16 Nov 2020 16:39)    I&O's Summary    16 Nov 2020 07:01  -  17 Nov 2020 07:00  --------------------------------------------------------  IN: 400 mL / OUT: 2200 mL / NET: -1800 mL        PHYSICAL EXAM:  Vital Signs Last 24 Hrs  T(C): 37.2 (17 Nov 2020 08:40), Max: 37.2 (17 Nov 2020 08:40)  T(F): 98.9 (17 Nov 2020 08:40), Max: 98.9 (17 Nov 2020 08:40)  HR: 65 (17 Nov 2020 08:40) (61 - 73)  BP: 95/54 (17 Nov 2020 08:40) (95/54 - 160/68)  BP(mean): --  RR: 16 (17 Nov 2020 08:40) (16 - 19)  SpO2: 100% (17 Nov 2020 08:40) (100% - 100%)    CONSTITUTIONAL: NAD, well-developed  RESPIRATORY: Normal respiratory effort; lungs are clear to auscultation bilaterally  CARDIOVASCULAR: Regular rate and rhythm, normal S1 and S2, no murmur/rub/gallop; No lower extremity edema; Peripheral pulses are 2+ bilaterally  ABDOMEN: Nontender to palpation, normoactive bowel sounds, no rebound/guarding; No hepatosplenomegaly  MUSCLOSKELETAL: no clubbing or cyanosis of digits; no joint swelling or tenderness to palpation  PSYCH: A+O to person, place, and time; affect appropriate  NEURO:  SKIN:    LABS:                        7.8    4.39  )-----------( 68       ( 17 Nov 2020 05:26 )             22.2     11-17    x   |  x   |  x   ----------------------------<  102<H>  x    |  x   |  x     Ca    8.6      17 Nov 2020 05:26  Phos  3.8     11-17  Mg     2.1     11-17    TPro  6.8  /  Alb  4.6  /  TBili  0.4  /  DBili  x   /  AST  41<H>  /  ALT  66<H>  /  AlkPhos  88  11-16    PT/INR - ( 17 Nov 2020 05:26 )   PT: 12.8 SEC;   INR: 1.13          PTT - ( 17 Nov 2020 05:26 )  PTT:29.0 SEC            RADIOLOGY & ADDITIONAL TESTS:  Results Reviewed:   Imaging Personally Reviewed:  Electrocardiogram Personally Reviewed:    COORDINATION OF CARE:  Care Discussed with Consultants/Other Providers [Y/N]:  Prior or Outpatient Records Reviewed [Y/N]:   Arabella Keyes  Hospitalist  Pager- [phone]  PROGRESS NOTE:     Patient is a 88y old  Female who presents with a chief complaint of missed HD, Hyperkalemia, (16 Nov 2020 22:59)      SUBJECTIVE / OVERNIGHT EVENTS: pt seen and examined by me   Daughter Dafne at bedside [ pt ate a cup of porridge and eating some crackers now  Denies nausea or vomiting  As per staff- pt had one episode of loose  BM this AM     ADDITIONAL REVIEW OF SYSTEMS:    MEDICATIONS  (STANDING):  atorvastatin 20 milliGRAM(s) Oral at bedtime  calcium acetate 667 milliGRAM(s) Oral three times a day with meals  chlorhexidine 4% Liquid 1 Application(s) Topical daily  dextrose 40% Gel 15 Gram(s) Oral once  dextrose 50% Injectable 25 Gram(s) IV Push once  dextrose 50% Injectable 12.5 Gram(s) IV Push once  dextrose 50% Injectable 25 Gram(s) IV Push once  glucagon  Injectable 1 milliGRAM(s) IntraMuscular once  influenza   Vaccine 0.5 milliLiter(s) IntraMuscular once  metoprolol succinate ER 25 milliGRAM(s) Oral daily  sodium chloride 0.9% lock flush 3 milliLiter(s) IV Push every 8 hours    MEDICATIONS  (PRN):      CAPILLARY BLOOD GLUCOSE      POCT Blood Glucose.: 122 mg/dL (17 Nov 2020 08:00)  POCT Blood Glucose.: 124 mg/dL (17 Nov 2020 06:36)  POCT Blood Glucose.: 112 mg/dL (17 Nov 2020 06:35)  POCT Blood Glucose.: 97 mg/dL (17 Nov 2020 06:22)  POCT Blood Glucose.: 60 mg/dL (17 Nov 2020 05:54)  POCT Blood Glucose.: 59 mg/dL (17 Nov 2020 05:53)  POCT Blood Glucose.: 94 mg/dL (16 Nov 2020 23:53)  POCT Blood Glucose.: 118 mg/dL (16 Nov 2020 18:27)  POCT Blood Glucose.: 120 mg/dL (16 Nov 2020 16:39)    I&O's Summary    16 Nov 2020 07:01  -  17 Nov 2020 07:00  --------------------------------------------------------  IN: 400 mL / OUT: 2200 mL / NET: -1800 mL        PHYSICAL EXAM:  Vital Signs Last 24 Hrs  T(C): 37.2 (17 Nov 2020 08:40), Max: 37.2 (17 Nov 2020 08:40)  T(F): 98.9 (17 Nov 2020 08:40), Max: 98.9 (17 Nov 2020 08:40)  HR: 65 (17 Nov 2020 08:40) (61 - 73)  BP: 95/54 (17 Nov 2020 08:40) (95/54 - 160/68)  BP(mean): --  RR: 16 (17 Nov 2020 08:40) (16 - 19)  SpO2: 100% (17 Nov 2020 08:40) (100% - 100%)    CONSTITUTIONAL: NAD   RESPIRATORY: Normal respiratory effort; lungs are clear to auscultation bilaterally . Left permacath present  CARDIOVASCULAR: Regular rate and rhythm, normal S1 and S2, no murmur/rub/gallop; No lower extremity edema; Peripheral pulses are 2+ bilaterally  ABDOMEN: Nontender to palpation, normoactive bowel sounds, no rebound/guarding; No hepatosplenomegaly  MUSCLOSKELETAL: no clubbing or cyanosis of digits; no joint swelling or tenderness to palpation  PSYCH: A+O to person, place, and time; affect appropriate  NEURO: no gross focal deficits       LABS:                        7.8    4.39  )-----------( 68       ( 17 Nov 2020 05:26 )             22.2     11-17    x   |  x   |  x   ----------------------------<  102<H>  x    |  x   |  x     Ca    8.6      17 Nov 2020 05:26  Phos  3.8     11-17  Mg     2.1     11-17    TPro  6.8  /  Alb  4.6  /  TBili  0.4  /  DBili  x   /  AST  41<H>  /  ALT  66<H>  /  AlkPhos  88  11-16    PT/INR - ( 17 Nov 2020 05:26 )   PT: 12.8 SEC;   INR: 1.13          PTT - ( 17 Nov 2020 05:26 )  PTT:29.0 SEC            RADIOLOGY & ADDITIONAL TESTS:  Results Reviewed:   Imaging Personally Reviewed:  Electrocardiogram Personally Reviewed: paced    COORDINATION OF CARE:  Care Discussed with Consultants/Other Providers [Y/N]: Renal  Prior or Outpatient Records Reviewed [Y/N]:

## 2020-11-17 NOTE — PROCEDURE NOTE - ADDITIONAL PROCEDURE DETAILS
normal pacemaker function   sensing and pacing well   good impedance   thresholds measured via iterative testing  no reprogramming required   report in chart    Events:   11/17/2020 - 11 brief runs (7 seconds -2 minutes) of Afib   10/1/2020- 8/27/2020- brief runs (less than 2 seconds) of PMT   2/20/2020- 8/9/2020- brief runs (less than 11 seconds) of  NSVT

## 2020-11-17 NOTE — PROGRESS NOTE ADULT - PROBLEM SELECTOR PLAN 4
Patient had loose BM/diarrhea outpatient. No further episodes. Monitor BM  If patient continues to have more episodes, then consider stool study, ova parasite  Lactose free diet Patient had loose BM/diarrhea , had 1 episode this AM   Fu Stool studies

## 2020-11-18 LAB
24R-OH-CALCIDIOL SERPL-MCNC: 44.7 NG/ML — SIGNIFICANT CHANGE UP (ref 30–80)
ALBUMIN SERPL ELPH-MCNC: 3.5 G/DL — SIGNIFICANT CHANGE UP (ref 3.3–5)
ALBUMIN SERPL ELPH-MCNC: 3.9 G/DL — SIGNIFICANT CHANGE UP (ref 3.3–5)
ALP SERPL-CCNC: 71 U/L — SIGNIFICANT CHANGE UP (ref 40–120)
ALP SERPL-CCNC: 77 U/L — SIGNIFICANT CHANGE UP (ref 40–120)
ALT FLD-CCNC: 42 U/L — HIGH (ref 4–33)
ALT FLD-CCNC: 48 U/L — HIGH (ref 4–33)
ANION GAP SERPL CALC-SCNC: 15 MMO/L — HIGH (ref 7–14)
ANION GAP SERPL CALC-SCNC: 17 MMO/L — HIGH (ref 7–14)
AST SERPL-CCNC: 46 U/L — HIGH (ref 4–32)
AST SERPL-CCNC: 53 U/L — HIGH (ref 4–32)
BILIRUB DIRECT SERPL-MCNC: < 0.2 MG/DL — SIGNIFICANT CHANGE UP (ref 0.1–0.2)
BILIRUB SERPL-MCNC: 0.3 MG/DL — SIGNIFICANT CHANGE UP (ref 0.2–1.2)
BILIRUB SERPL-MCNC: 0.4 MG/DL — SIGNIFICANT CHANGE UP (ref 0.2–1.2)
BUN SERPL-MCNC: 47 MG/DL — HIGH (ref 7–23)
BUN SERPL-MCNC: 47 MG/DL — HIGH (ref 7–23)
CALCIUM SERPL-MCNC: 8.1 MG/DL — LOW (ref 8.4–10.5)
CALCIUM SERPL-MCNC: 8.3 MG/DL — LOW (ref 8.4–10.5)
CHLORIDE SERPL-SCNC: 101 MMOL/L — SIGNIFICANT CHANGE UP (ref 98–107)
CHLORIDE SERPL-SCNC: 102 MMOL/L — SIGNIFICANT CHANGE UP (ref 98–107)
CO2 SERPL-SCNC: 17 MMOL/L — LOW (ref 22–31)
CO2 SERPL-SCNC: 19 MMOL/L — LOW (ref 22–31)
CREAT SERPL-MCNC: 5.42 MG/DL — HIGH (ref 0.5–1.3)
CREAT SERPL-MCNC: 5.57 MG/DL — HIGH (ref 0.5–1.3)
FRUCTOSAMINE SERPL-MCNC: 256 UMOL/L — SIGNIFICANT CHANGE UP (ref 205–285)
GLUCOSE BLDC GLUCOMTR-MCNC: 117 MG/DL — HIGH (ref 70–99)
GLUCOSE BLDC GLUCOMTR-MCNC: 137 MG/DL — HIGH (ref 70–99)
GLUCOSE BLDC GLUCOMTR-MCNC: 146 MG/DL — HIGH (ref 70–99)
GLUCOSE BLDC GLUCOMTR-MCNC: 186 MG/DL — HIGH (ref 70–99)
GLUCOSE BLDC GLUCOMTR-MCNC: 98 MG/DL — SIGNIFICANT CHANGE UP (ref 70–99)
GLUCOSE SERPL-MCNC: 113 MG/DL — HIGH (ref 70–99)
GLUCOSE SERPL-MCNC: 173 MG/DL — HIGH (ref 70–99)
HAPTOGLOB SERPL-MCNC: 170 MG/DL — SIGNIFICANT CHANGE UP (ref 34–200)
HAV IGM SER-ACNC: NONREACTIVE — SIGNIFICANT CHANGE UP
HBV CORE AB SER-ACNC: REACTIVE — HIGH
HBV CORE IGM SER-ACNC: NONREACTIVE — SIGNIFICANT CHANGE UP
HBV SURFACE AB SER-ACNC: >1000 MLU/ML — SIGNIFICANT CHANGE UP
HBV SURFACE AG SER-ACNC: NONREACTIVE — SIGNIFICANT CHANGE UP
HCT VFR BLD CALC: 22.5 % — LOW (ref 34.5–45)
HCV AB S/CO SERPL IA: 0.06 S/CO — SIGNIFICANT CHANGE UP (ref 0–0.99)
HCV AB S/CO SERPL IA: 0.06 S/CO — SIGNIFICANT CHANGE UP (ref 0–0.99)
HCV AB SERPL-IMP: SIGNIFICANT CHANGE UP
HCV AB SERPL-IMP: SIGNIFICANT CHANGE UP
HGB BLD-MCNC: 7.7 G/DL — LOW (ref 11.5–15.5)
LDH SERPL L TO P-CCNC: 260 U/L — HIGH (ref 135–225)
LDH SERPL L TO P-CCNC: 267 U/L — HIGH (ref 135–225)
MAGNESIUM SERPL-MCNC: 2.2 MG/DL — SIGNIFICANT CHANGE UP (ref 1.6–2.6)
MCHC RBC-ENTMCNC: 32.4 PG — SIGNIFICANT CHANGE UP (ref 27–34)
MCHC RBC-ENTMCNC: 34.2 % — SIGNIFICANT CHANGE UP (ref 32–36)
MCV RBC AUTO: 94.5 FL — SIGNIFICANT CHANGE UP (ref 80–100)
MRSA PCR RESULT.: SIGNIFICANT CHANGE UP
NRBC # FLD: 0 K/UL — SIGNIFICANT CHANGE UP (ref 0–0)
PHOSPHATE SERPL-MCNC: 4.7 MG/DL — HIGH (ref 2.5–4.5)
PLATELET # BLD AUTO: 66 K/UL — LOW (ref 150–400)
PMV BLD: 14 FL — HIGH (ref 7–13)
POTASSIUM SERPL-MCNC: 4.8 MMOL/L — SIGNIFICANT CHANGE UP (ref 3.5–5.3)
POTASSIUM SERPL-MCNC: 5.2 MMOL/L — SIGNIFICANT CHANGE UP (ref 3.5–5.3)
POTASSIUM SERPL-SCNC: 4.8 MMOL/L — SIGNIFICANT CHANGE UP (ref 3.5–5.3)
POTASSIUM SERPL-SCNC: 5.2 MMOL/L — SIGNIFICANT CHANGE UP (ref 3.5–5.3)
PROT SERPL-MCNC: 5.2 G/DL — LOW (ref 6–8.3)
PROT SERPL-MCNC: 5.8 G/DL — LOW (ref 6–8.3)
RBC # BLD: 2.38 M/UL — LOW (ref 3.8–5.2)
RBC # FLD: 13.5 % — SIGNIFICANT CHANGE UP (ref 10.3–14.5)
RETICS #: 90 K/UL — SIGNIFICANT CHANGE UP (ref 25–125)
RETICS/RBC NFR: 3.3 % — HIGH (ref 0.5–2.5)
S AUREUS DNA NOSE QL NAA+PROBE: NOT DETECTED — SIGNIFICANT CHANGE UP
SARS-COV-2 IGG SERPL QL IA: NEGATIVE — SIGNIFICANT CHANGE UP
SARS-COV-2 IGM SERPL IA-ACNC: 0.35 INDEX — SIGNIFICANT CHANGE UP
SODIUM SERPL-SCNC: 135 MMOL/L — SIGNIFICANT CHANGE UP (ref 135–145)
SODIUM SERPL-SCNC: 136 MMOL/L — SIGNIFICANT CHANGE UP (ref 135–145)
TRANSFERRIN SERPL-MCNC: 123 MG/DL — LOW (ref 200–360)
WBC # BLD: 4.07 K/UL — SIGNIFICANT CHANGE UP (ref 3.8–10.5)
WBC # FLD AUTO: 4.07 K/UL — SIGNIFICANT CHANGE UP (ref 3.8–10.5)

## 2020-11-18 PROCEDURE — 99233 SBSQ HOSP IP/OBS HIGH 50: CPT

## 2020-11-18 PROCEDURE — 99232 SBSQ HOSP IP/OBS MODERATE 35: CPT | Mod: GC

## 2020-11-18 PROCEDURE — 99223 1ST HOSP IP/OBS HIGH 75: CPT

## 2020-11-18 RX ADMIN — Medication 25 MILLIGRAM(S): at 06:16

## 2020-11-18 RX ADMIN — SODIUM CHLORIDE 3 MILLILITER(S): 9 INJECTION INTRAMUSCULAR; INTRAVENOUS; SUBCUTANEOUS at 22:00

## 2020-11-18 RX ADMIN — CHLORHEXIDINE GLUCONATE 1 APPLICATION(S): 213 SOLUTION TOPICAL at 12:14

## 2020-11-18 RX ADMIN — SODIUM CHLORIDE 3 MILLILITER(S): 9 INJECTION INTRAMUSCULAR; INTRAVENOUS; SUBCUTANEOUS at 06:15

## 2020-11-18 RX ADMIN — SODIUM CHLORIDE 3 MILLILITER(S): 9 INJECTION INTRAMUSCULAR; INTRAVENOUS; SUBCUTANEOUS at 14:01

## 2020-11-18 RX ADMIN — Medication 667 MILLIGRAM(S): at 12:14

## 2020-11-18 RX ADMIN — ATORVASTATIN CALCIUM 20 MILLIGRAM(S): 80 TABLET, FILM COATED ORAL at 22:00

## 2020-11-18 RX ADMIN — Medication 667 MILLIGRAM(S): at 08:17

## 2020-11-18 NOTE — PROGRESS NOTE ADULT - PROBLEM SELECTOR PLAN 8
Appreciate EP consult for PPM interrogation-"Patient's pacemaker battery near [place]. Discussed with Dr. Perez. Will give f/u for the device clinic and with Dr. Perez in 6 weeks. No generator change required at this time"

## 2020-11-18 NOTE — PROGRESS NOTE ADULT - PROBLEM SELECTOR PLAN 3
on HD  Renal following-HD per renal  home med Lasix on hold as pt was  hypotensive   as per daughter, pt was evaluated for AVF and had preliminary procedures performed but pt could not tolerate due to arm pain   Social work/case management for HD placement outpatient

## 2020-11-18 NOTE — PROGRESS NOTE ADULT - PROBLEM SELECTOR PLAN 6
Platelets dropped from 117 to 68   No signs of active bleeding   continue to monitor  Reviewed outpt records ( Records in chart) - Platelet count was 267 on 6/12/20  will obtain heme consult

## 2020-11-18 NOTE — PROGRESS NOTE ADULT - PROBLEM SELECTOR PLAN 3
acidosis  most likely 2/2 renal failure. improving   will schedule for HD tonight, continue to monitor.

## 2020-11-18 NOTE — PROGRESS NOTE ADULT - PROBLEM SELECTOR PLAN 5
Patient with hyperphosphatemia - pth [phone]   c/w phosphate binders with meals.  Low phosphorus diet.  Monitor serum phosphorus.

## 2020-11-18 NOTE — PROGRESS NOTE ADULT - SUBJECTIVE AND OBJECTIVE BOX
[facility] DIVISION OF KIDNEY DISEASES AND HYPERTENSION -- FOLLOW UP NOTE  --------------------------------------------------------------------------------  If any questions, please feel free to contact me  NS pager: [phone], [facility]: [phone]  Edmundo Obrien M.D.  Nephrology Fellow    (After 5 pm or on weekends please page the on-call fellow)  --------------------------------------------------------------------------------    Chief Complaint:  Patient is a 88y old  Female who presents with a chief complaint of missed HD, Hyperkalemia, (18 Nov 2020 10:15)    24 hour events/subjective:  Patient seen and examined at bedside this am, schedule for HD - currently resting comfortable in bed, she has shirley cute complaints. Vital/labs/imaging reviewed.       PAST HISTORY  --------------------------------------------------------------------------------  No significant changes to PMH, PSH, FHx, SHx, unless otherwise noted    ALLERGIES & MEDICATIONS  --------------------------------------------------------------------------------  Allergies    No Known Allergies    Intolerances      Standing Inpatient Medications  atorvastatin 20 milliGRAM(s) Oral at bedtime  calcium acetate 667 milliGRAM(s) Oral three times a day with meals  chlorhexidine 4% Liquid 1 Application(s) Topical daily  dextrose 40% Gel 15 Gram(s) Oral once  dextrose 50% Injectable 25 Gram(s) IV Push once  dextrose 50% Injectable 12.5 Gram(s) IV Push once  dextrose 50% Injectable 25 Gram(s) IV Push once  glucagon  Injectable 1 milliGRAM(s) IntraMuscular once  influenza   Vaccine 0.5 milliLiter(s) IntraMuscular once  metoprolol succinate ER 25 milliGRAM(s) Oral daily  sodium chloride 0.9% lock flush 3 milliLiter(s) IV Push every 8 hours    PRN Inpatient Medications      REVIEW OF SYSTEMS  --------------------------------------------------------------------------------  Gen: No fevers/chills  Skin: No rashes  Respiratory: No dyspnea, cough  CV: No chest pain  GI: No abdominal pain, diarrhea  : No dysuria, hematuria  MSK: No  edema      All other systems were reviewed and are negative, except as noted.    VITALS/PHYSICAL EXAM  --------------------------------------------------------------------------------  T(C): 36.3 (11-18-20 @ 10:10), Max: 36.8 (11-17-20 @ 19:20)  HR: 67 (11-18-20 @ 10:10) (59 - 77)  BP: 101/64 (11-18-20 @ 10:10) (92/51 - 119/69)  RR: 16 (11-18-20 @ 10:10) (16 - 17)  SpO2: 99% (11-18-20 @ 10:10) (99% - 100%)  Wt(kg): --  Height (cm): 154.9 (11-17-20 @ 00:43)  Weight (kg): 69 (11-17-20 @ 00:43)  BMI (kg/m2): 28.8 (11-17-20 @ 00:43)  BSA (m2): 1.68 (11-17-20 @ 00:43)        Physical Exam:  	Gen: NAD, cooperative  	Pulm: CTA B/L, no wheezing, no rales  	CV: S1S2, RRR  	Abd: Soft, +BS, NT, ND  	Ext: No LE edema B/L  	Neuro: Awake, follows commands   	Skin: Warm and dry  	Vascular access: right sided tunneled cath       LABS/STUDIES  --------------------------------------------------------------------------------              7.7    4.07  >-----------<  66       [11-18-20 @ 07:25]              22.5     136  |  102  |  47  ----------------------------<  113      [11-18-20 @ 07:25]  4.8   |  17  |  5.42        Ca     8.1     [11-18-20 @ 07:25]      Mg     2.2     [11-18-20 @ 07:25]      Phos  4.7     [11-18-20 @ 07:25]    TPro  6.8  /  Alb  4.6  /  TBili  0.4  /  DBili  x   /  AST  41  /  ALT  66  /  AlkPhos  88  [11-16-20 @ 15:00]    PT/INR: PT 12.8 , INR 1.13       [11-17-20 @ 05:26]  PTT: 29.0       [11-17-20 @ 05:26]      Creatinine Trend:  SCr 5.42 [11-18 @ 07:25]  SCr 4.07 [11-17 @ 05:26]  SCr 7.29 [11-16 @ 19:36]  SCr 7.55 [11-16 @ 18:55]  SCr 7.98 [11-16 @ 15:00]    Urinalysis - [11-17-20 @ 20:00]      Color YELLOW / Appearance Lt TURBID / SG 1.014 / pH 6.0      Gluc 70 / Ketone NEGATIVE  / Bili NEGATIVE / Urobili NORMAL       Blood TRACE / Protein 50 / Leuk Est TRACE / Nitrite NEGATIVE      RBC 6-10 / WBC 6-10 / Hyaline 1+ / Gran  / Sq Epi MODERATE / Non Sq Epi  / Bacteria MODERATE      Iron 61, TIBC 145, %sat --      [11-17-20 @ 05:26]  Ferritin 843.8      [11-17-20 @ 05:26]  .8 (Ca --)      [11-17-20 @ 05:26]   --  Vitamin D (25OH) 44.7      [11-17-20 @ 05:26]  TSH 2.85      [11-17-20 @ 05:26]  Lipid: chol 106, , HDL 29, LDL 69      [11-17-20 @ 05:26]    HBsAb >1000.0      [11-16-20 @ 21:40]  HBsAg Nonreactive      [11-17-20 @ 05:26]  HBcAb Reactive      [11-16-20 @ 21:40]  HCV 0.06, Nonreactive Hepatitis C AB  S/CO Ratio                        Interpretation  < 1.00                                   Non-Reactive  1.00 - 4.99                         Weakly-Reactive  >= 5.00                                Reactive  Non-Reactive: Aperson with a non-reactive HCV antibody  result is considered uninfected.  No further action is  needed unless recent infection is suspected.  In these  cases, consider repeat testing later to detect  seroconversion..  Weakly-Reactive: HCV antibody test is abnormal, HCV RNA  Qualitative test will follow.  Reactive: HCV antibody test is abnormal, HCV RNA  Qualitative test will follow.  Note: HCV antibody testing is performed on the [employer]   system.      [11-17-20 @ 05:26]

## 2020-11-18 NOTE — CONSULT NOTE ADULT - ATTENDING COMMENTS
Consulted for new thrombocytopenia  No evidence of infection, no reported new medications  PBS reviewed, red cells no schistocytes, plts 7-8/hpf, no plt clumping  No evidence of TMA, TTP  HIV neg, previous hepatitis exposure  B12 1132/folate wnl  Doubt rheumatologic cause at this age  h/o colon ca s/p hemicolectomy, no s/s to suggest recurrence  No heparin exposure during HD, had one dose on 11/17  Low probability 4T score, low suspicion of MAVERICK  r/o thromboses, obtain US of upper and lower extremities b/l  ?from restarting HD, continue to monitor counts daily

## 2020-11-18 NOTE — PROGRESS NOTE ADULT - PROBLEM SELECTOR PLAN 1
Pt. with ESRD on HD three times a week. Last HD was about 3 weeks before admission via right tunneled cath. Unknown dialysis center -Pt. clinically stable. Labs reviewed and remarkable for hyperkalemia. Will arrange for maintenance HD today. Monitor labs.

## 2020-11-18 NOTE — PROGRESS NOTE ADULT - SUBJECTIVE AND OBJECTIVE BOX
Arabella Keyes  Hospitalist  Pager- [phone]  PROGRESS NOTE:     Patient is a 88y old  Female who presents with a chief complaint of missed HD, Hyperkalemia, (16 Nov 2020 22:59)      SUBJECTIVE / OVERNIGHT EVENTS: pt seen and examined by me   Tolerating PO. Denies nausea or vomiting  As per staff- no further diarrhea since yesterday AM       ADDITIONAL REVIEW OF SYSTEMS:    MEDICATIONS  (STANDING):  atorvastatin 20 milliGRAM(s) Oral at bedtime  calcium acetate 667 milliGRAM(s) Oral three times a day with meals  chlorhexidine 4% Liquid 1 Application(s) Topical daily  dextrose 40% Gel 15 Gram(s) Oral once  dextrose 50% Injectable 25 Gram(s) IV Push once  dextrose 50% Injectable 12.5 Gram(s) IV Push once  dextrose 50% Injectable 25 Gram(s) IV Push once  glucagon  Injectable 1 milliGRAM(s) IntraMuscular once  influenza   Vaccine 0.5 milliLiter(s) IntraMuscular once  metoprolol succinate ER 25 milliGRAM(s) Oral daily  sodium chloride 0.9% lock flush 3 milliLiter(s) IV Push every 8 hours    MEDICATIONS  (PRN):      CAPILLARY BLOOD GLUCOSE  POCT Blood Glucose.: 117 mg/dL (18 Nov 2020 06:35)  POCT Blood Glucose.: 137 mg/dL (18 Nov 2020 01:02)  POCT Blood Glucose.: 158 mg/dL (17 Nov 2020 12:38)    I&O's Detail    PHYSICAL EXAM:    Vital Signs Last 24 Hrs  tele reviewed by me- A v paced   T(C): 36.4 (18 Nov 2020 06:13), Max: 36.8 (17 Nov 2020 19:20)  T(F): 97.5 (18 Nov 2020 06:13), Max: 98.3 (17 Nov 2020 19:20)  HR: 74 (18 Nov 2020 06:13) (59 - 77)  BP: 119/69 (18 Nov 2020 06:13) (90/44 - 119/69)  BP(mean): --  RR: 17 (18 Nov 2020 06:13) (16 - 17)  SpO2: 100% (18 Nov 2020 06:13) (99% - 100%)      CONSTITUTIONAL: NAD   RESPIRATORY: Normal respiratory effort; lungs are clear to auscultation bilaterally . Left permacath present  CARDIOVASCULAR: Regular rate and rhythm, normal S1 and S2, no murmur/rub/gallop; No lower extremity edema; Peripheral pulses are 2+ bilaterally  ABDOMEN: Nontender to palpation, normoactive bowel sounds, no rebound/guarding; No hepatosplenomegaly  MUSCLOSKELETAL: no clubbing or cyanosis of digits; no joint swelling or tenderness to palpation  PSYCH: A+O to person, place, and time; affect appropriate  NEURO: no gross focal deficits       LABS:                                       7.7    4.07  )-----------( 66       ( 18 Nov 2020 07:25 )             22.5       11-18    136  |  102  |  47<H>  ----------------------------<  113<H>  4.8   |  17<L>  |  5.42<H>    Ca    8.1<L>      18 Nov 2020 07:25  Phos  4.7     11-18  Mg     2.2     11-18    TPro  6.8  /  Alb  4.6  /  TBili  0.4  /  DBili  x   /  AST  41<H>  /  ALT  66<H>  /  AlkPhos  88  11-16          RADIOLOGY & ADDITIONAL TESTS:  Results Reviewed:   Imaging Personally Reviewed:  Electrocardiogram Personally Reviewed: paced    COORDINATION OF CARE:  Care Discussed with Consultants/Other Providers [Y/N]: Renal  Prior or Outpatient Records Reviewed [Y/N]:

## 2020-11-18 NOTE — PROGRESS NOTE ADULT - PROBLEM SELECTOR PLAN 5
Likely Anemia of chronic disease  Monitor H/H  no signs of active bleeding  Iron studies cw AOCD  Plan for  DELILAH as per Renal

## 2020-11-18 NOTE — PROGRESS NOTE ADULT - PROBLEM SELECTOR PLAN 4
Patient had loose BM/diarrhea , had 1 episode on 11/17 AM  None since then   Stool studies pending collection as pt not had diarrhea since then

## 2020-11-18 NOTE — PROGRESS NOTE ADULT - PROBLEM SELECTOR PLAN 1
due to missed HD As per daughter pt missed HD x  1 month   EKG- AV paced  Hyperkalemia resolved   S/P IV Ca Gluconate, D50 and regular Insulin , Lokelma  10 gm po x 1  Received HD   monitor BMP s/p HD.  Renal consult appreciated

## 2020-11-18 NOTE — CONSULT NOTE ADULT - ASSESSMENT
#Thrombocytopenia   -unclear cause, could be multifactorial in the setting of possible infection with diarrhea however need to rule out other causes   -does not appear to be hemolyzing as haptoglobin normal, LDH mildly elevated, total bilirubin normal   -will review peripheral smear   -4T Score intermediate probability of HIT, possible patient exposed to heparin during HD and now having abrupt platelet drop   -Please send off HIT ab and SUAD   -avoid heparin products #Thrombocytopenia   -unclear cause, could be multifactorial in the setting of possible infection with diarrhea however need to rule out other causes   -does not appear to be hemolyzing as haptoglobin normal, LDH mildly elevated, total bilirubin normal   -will review peripheral smear   -4T Score Low probability of HIT  -also could be related to HD as platelets can stick to polysulfone membrane     NOTE INCOMPLETE     Aden Peter MD   Hematology/Oncology Fellow   pager [phone]  After 5pm and on weekends page on call fellow    87 y/o F with hx of HTN, colon ca s/p hemicolectomy, ESRD on HD via permacath, presents with nausea and vomiting and diarrhea x few days.     #Thrombocytopenia   -unclear cause, could be multifactorial in the setting of possible infection with diarrhea however need to rule out other causes. Also no longer having diarrhea   -does not appear to be hemolyzing as haptoglobin normal, LDH mildly elevated, total bilirubin normal. Not consistent with TMA/TTP   -Not taking any medications or any recent medications that could cause a thrombocytopenia   -4T Score Low probability of HIT  -also could be related to HD as platelets can stick to polysulfone membrane   -Please order US LE Doppler   -HIV, hepatitis panel   -Peripheral smear with on average 7-8 platelets/HPF consistent with a plt count of 70-80, no schistocytes, RBC morphology normal, normal WBC morphology  -Will continue to monitor platelet count     Aden Peter MD   Hematology/Oncology Fellow   pager [phone]  After 5pm and on weekends page on call fellow    89 y/o F with hx of HTN, colon ca s/p hemicolectomy, ESRD on HD via permacath, presents with nausea and vomiting and diarrhea x few days.     #Thrombocytopenia   -unclear cause, could be multifactorial in the setting of possible infection with diarrhea however need to rule out other causes. Also no longer having diarrhea   -does not appear to be hemolyzing as haptoglobin normal, LDH mildly elevated, total bilirubin normal. Not consistent with TMA/TTP   -Not taking any medications or any recent medications that could cause a thrombocytopenia   -4T Score Low probability of HIT however had exposure on 11/17. Please send HIT assay and SUAD   -also could be related to HD as platelets can stick to polysulfone membrane   -Please order US LE/UE Doppler   -HIV, hepatitis panel   -Peripheral smear with on average 7-8 platelets/HPF consistent with a plt count of 70-80, no schistocytes, RBC morphology normal, normal WBC morphology  -Will continue to monitor platelet count     Aden Peter MD   Hematology/Oncology Fellow   pager [phone]  After 5pm and on weekends page on call fellow

## 2020-11-18 NOTE — CHART NOTE - NSCHARTNOTEFT_GEN_A_CORE
Patient's pacemaker battery near [place]. Discussed with Dr. Perez. Will give f/u for the device clinic and with Dr. Perez in 6 weeks. No generator change required at this time. Patient's pacemaker battery near [place]. Discussed with Dr. Perez. Will give f/u for the device clinic and with Dr. Perez in 6 weeks. No generator change required at this time.  F/U appointment given to patient for device clinic on 1/8/20 @ 9:45am and with Dr. Perez at 10am.

## 2020-11-19 LAB
ANION GAP SERPL CALC-SCNC: 11 MMO/L — SIGNIFICANT CHANGE UP (ref 7–14)
BASOPHILS # BLD AUTO: 0.01 K/UL — SIGNIFICANT CHANGE UP (ref 0–0.2)
BASOPHILS NFR BLD AUTO: 0.3 % — SIGNIFICANT CHANGE UP (ref 0–2)
BUN SERPL-MCNC: 20 MG/DL — SIGNIFICANT CHANGE UP (ref 7–23)
CALCIUM SERPL-MCNC: 7.9 MG/DL — LOW (ref 8.4–10.5)
CHLORIDE SERPL-SCNC: 99 MMOL/L — SIGNIFICANT CHANGE UP (ref 98–107)
CO2 SERPL-SCNC: 25 MMOL/L — SIGNIFICANT CHANGE UP (ref 22–31)
CREAT SERPL-MCNC: 3.38 MG/DL — HIGH (ref 0.5–1.3)
EOSINOPHIL # BLD AUTO: 0.08 K/UL — SIGNIFICANT CHANGE UP (ref 0–0.5)
EOSINOPHIL NFR BLD AUTO: 2.2 % — SIGNIFICANT CHANGE UP (ref 0–6)
GLUCOSE BLDC GLUCOMTR-MCNC: 104 MG/DL — HIGH (ref 70–99)
GLUCOSE BLDC GLUCOMTR-MCNC: 116 MG/DL — HIGH (ref 70–99)
GLUCOSE BLDC GLUCOMTR-MCNC: 151 MG/DL — HIGH (ref 70–99)
GLUCOSE BLDC GLUCOMTR-MCNC: 155 MG/DL — HIGH (ref 70–99)
GLUCOSE SERPL-MCNC: 93 MG/DL — SIGNIFICANT CHANGE UP (ref 70–99)
HCT VFR BLD CALC: 22.9 % — LOW (ref 34.5–45)
HGB BLD-MCNC: 7.8 G/DL — LOW (ref 11.5–15.5)
HIV 1+2 AB+HIV1 P24 AG SERPL QL IA: SIGNIFICANT CHANGE UP
IMM GRANULOCYTES NFR BLD AUTO: 0.3 % — SIGNIFICANT CHANGE UP (ref 0–1.5)
LYMPHOCYTES # BLD AUTO: 0.92 K/UL — LOW (ref 1–3.3)
LYMPHOCYTES # BLD AUTO: 25.3 % — SIGNIFICANT CHANGE UP (ref 13–44)
MAGNESIUM SERPL-MCNC: 2 MG/DL — SIGNIFICANT CHANGE UP (ref 1.6–2.6)
MCHC RBC-ENTMCNC: 32.2 PG — SIGNIFICANT CHANGE UP (ref 27–34)
MCHC RBC-ENTMCNC: 34.1 % — SIGNIFICANT CHANGE UP (ref 32–36)
MCV RBC AUTO: 94.6 FL — SIGNIFICANT CHANGE UP (ref 80–100)
MONOCYTES # BLD AUTO: 0.52 K/UL — SIGNIFICANT CHANGE UP (ref 0–0.9)
MONOCYTES NFR BLD AUTO: 14.3 % — HIGH (ref 2–14)
NEUTROPHILS # BLD AUTO: 2.1 K/UL — SIGNIFICANT CHANGE UP (ref 1.8–7.4)
NEUTROPHILS NFR BLD AUTO: 57.6 % — SIGNIFICANT CHANGE UP (ref 43–77)
NRBC # FLD: 0 K/UL — SIGNIFICANT CHANGE UP (ref 0–0)
PLATELET # BLD AUTO: 50 K/UL — LOW (ref 150–400)
PMV BLD: 13.8 FL — HIGH (ref 7–13)
POTASSIUM SERPL-MCNC: 3.9 MMOL/L — SIGNIFICANT CHANGE UP (ref 3.5–5.3)
POTASSIUM SERPL-SCNC: 3.9 MMOL/L — SIGNIFICANT CHANGE UP (ref 3.5–5.3)
RBC # BLD: 2.42 M/UL — LOW (ref 3.8–5.2)
RBC # FLD: 13.3 % — SIGNIFICANT CHANGE UP (ref 10.3–14.5)
SODIUM SERPL-SCNC: 135 MMOL/L — SIGNIFICANT CHANGE UP (ref 135–145)
WBC # BLD: 3.64 K/UL — LOW (ref 3.8–10.5)
WBC # FLD AUTO: 3.64 K/UL — LOW (ref 3.8–10.5)

## 2020-11-19 PROCEDURE — 93970 EXTREMITY STUDY: CPT | Mod: 26

## 2020-11-19 PROCEDURE — 99233 SBSQ HOSP IP/OBS HIGH 50: CPT

## 2020-11-19 RX ADMIN — Medication 667 MILLIGRAM(S): at 17:23

## 2020-11-19 RX ADMIN — SODIUM CHLORIDE 3 MILLILITER(S): 9 INJECTION INTRAMUSCULAR; INTRAVENOUS; SUBCUTANEOUS at 22:24

## 2020-11-19 RX ADMIN — SODIUM CHLORIDE 3 MILLILITER(S): 9 INJECTION INTRAMUSCULAR; INTRAVENOUS; SUBCUTANEOUS at 06:30

## 2020-11-19 RX ADMIN — ATORVASTATIN CALCIUM 20 MILLIGRAM(S): 80 TABLET, FILM COATED ORAL at 22:22

## 2020-11-19 RX ADMIN — Medication 25 MILLIGRAM(S): at 06:30

## 2020-11-19 RX ADMIN — Medication 667 MILLIGRAM(S): at 12:38

## 2020-11-19 RX ADMIN — SODIUM CHLORIDE 3 MILLILITER(S): 9 INJECTION INTRAMUSCULAR; INTRAVENOUS; SUBCUTANEOUS at 13:37

## 2020-11-19 RX ADMIN — CHLORHEXIDINE GLUCONATE 1 APPLICATION(S): 213 SOLUTION TOPICAL at 12:38

## 2020-11-19 NOTE — PROGRESS NOTE ADULT - PROBLEM SELECTOR PLAN 2
Admision K of 6.4 due to missed HD  x  1 month   EKG- AV paced  Hyperkalemia resolved   S/P IV Ca Gluconate, D50 and regular Insulin , Lokelma  10 gm po x 1  Received HD   monitor BMP s/p HD.  Renal on board

## 2020-11-19 NOTE — PROGRESS NOTE ADULT - PROBLEM SELECTOR PLAN 7
SBP borderline low   Was not able to be dialzyed on 11/17 due to low BP   Will dc Metoprolol  dash diet

## 2020-11-19 NOTE — PROGRESS NOTE ADULT - PROBLEM SELECTOR PLAN 6
Likely Anemia of chronic disease  Monitor H/H  no signs of active bleeding  Iron studies cw AOCD  DW Renal -Plan for  DELILAH as per Renal

## 2020-11-19 NOTE — PROGRESS NOTE ADULT - PROBLEM SELECTOR PLAN 1
Platelets dropped from 117 to 68 to 50   No signs of active bleeding   continue to monitor  Reviewed outpt records ( Records in chart) - Platelet count was 267 on 6/12/20  Appreciate- heme consult-Unclear cause. could be related to HD as platelets can stick to polysulfone membrane. Not consistent with TMA/TTP/HIT.   Peripheral smear with on average 7-8 platelets/HPF consistent with a plt count of 70-80, no schistocytes, RBC morphology normal, normal WBC morphology  Advise  US LE Doppler,HIV, hepatitis panel

## 2020-11-19 NOTE — PROGRESS NOTE ADULT - PROBLEM SELECTOR PLAN 4
on HD  Was not able to get HD yesterday because her BP was too low  Renal following-HD per renal  home med Lasix on hold as pt was  hypotensive   as per daughter, pt was evaluated for AVF and had preliminary procedures performed but pt could not tolerate due to arm pain

## 2020-11-19 NOTE — PROGRESS NOTE ADULT - SUBJECTIVE AND OBJECTIVE BOX
Arabella Keyes  Hospitalist  Pager- [phone]  PROGRESS NOTE:     Patient is a 88y old  Female who presents with a chief complaint of missed HD, Hyperkalemia, (16 Nov 2020 22:59)      SUBJECTIVE / OVERNIGHT EVENTS: pt seen and examined by me   C/o Chronic right hand pain. As per pt,she has intermittent pain since they attempted to place the fistula   No further episodes of diarrhea   Patient could not get HD yesterday as  her BP was too low    ADDITIONAL REVIEW OF SYSTEMS:    MEDICATIONS  (STANDING):  atorvastatin 20 milliGRAM(s) Oral at bedtime  calcium acetate 667 milliGRAM(s) Oral three times a day with meals  chlorhexidine 4% Liquid 1 Application(s) Topical daily  dextrose 40% Gel 15 Gram(s) Oral once  dextrose 50% Injectable 25 Gram(s) IV Push once  dextrose 50% Injectable 12.5 Gram(s) IV Push once  dextrose 50% Injectable 25 Gram(s) IV Push once  glucagon  Injectable 1 milliGRAM(s) IntraMuscular once  influenza   Vaccine 0.5 milliLiter(s) IntraMuscular once  metoprolol succinate ER 25 milliGRAM(s) Oral daily  sodium chloride 0.9% lock flush 3 milliLiter(s) IV Push every 8 hours    MEDICATIONS  (PRN):      CAPILLARY BLOOD GLUCOSE  POCT Blood Glucose.: 104 mg/dL (19 Nov 2020 05:13)  POCT Blood Glucose.: 146 mg/dL (18 Nov 2020 23:31)  POCT Blood Glucose.: 98 mg/dL (18 Nov 2020 18:22)  POCT Blood Glucose.: 186 mg/dL (18 Nov 2020 12:24)    I&O's Detail    PHYSICAL EXAM:    Vital Signs Last 24 Hrs  tele reviewed by isaias RANDHAWA v paced     Vital Signs Last 24 Hrs  T(C): 37.1 (19 Nov 2020 09:07), Max: 37.1 (18 Nov 2020 15:59)  T(F): 98.8 (19 Nov 2020 09:07), Max: 98.8 (19 Nov 2020 09:07)  HR: 65 (19 Nov 2020 09:07) (60 - 66)  BP: 100/52 (19 Nov 2020 09:07) (100/52 - 124/68)  BP(mean): 63 (19 Nov 2020 09:07) (63 - 63)  RR: 18 (19 Nov 2020 09:07) (15 - 18)  SpO2: 100% (19 Nov 2020 09:07) (99% - 100%)    CONSTITUTIONAL: NAD   RESPIRATORY: Normal respiratory effort; lungs are clear to auscultation bilaterally . Left permacath present  CARDIOVASCULAR: Regular rate and rhythm, normal S1 and S2, no murmur/rub/gallop; No lower extremity edema; Peripheral pulses are 2+ bilaterally  ABDOMEN: Nontender to palpation, normoactive bowel sounds, no rebound/guarding; No hepatosplenomegaly  MUSCLOSKELETAL: no clubbing or cyanosis of digits; no joint swelling or tenderness to palpation  PSYCH: A+O to person, place, and time; affect appropriate  NEURO: no gross focal deficits       LABS:                                                      7.8    3.64  )-----------( 50       ( 19 Nov 2020 05:14 )             22.9     11-19    135  |  99  |  20  ----------------------------<  93  3.9   |  25  |  3.38<H>    Ca    7.9<L>      19 Nov 2020 05:14  Phos  4.7     11-18  Mg     2.0     11-19    TPro  5.8<L>  /  Alb  3.9  /  TBili  0.4  /  DBili  x   /  AST  46<H>  /  ALT  48<H>  /  AlkPhos  77  11-18      RADIOLOGY & ADDITIONAL TESTS:  Results Reviewed:   Imaging Personally Reviewed:  Electrocardiogram Personally Reviewed: paced    COORDINATION OF CARE:  Care Discussed with Consultants/Other Providers [Y/N]: Renal, hematology   Prior or Outpatient Records Reviewed [Y/N]:Hematology

## 2020-11-20 LAB
ANION GAP SERPL CALC-SCNC: 12 MMO/L — SIGNIFICANT CHANGE UP (ref 7–14)
BASOPHILS # BLD AUTO: 0.01 K/UL — SIGNIFICANT CHANGE UP (ref 0–0.2)
BASOPHILS NFR BLD AUTO: 0.3 % — SIGNIFICANT CHANGE UP (ref 0–2)
BUN SERPL-MCNC: 29 MG/DL — HIGH (ref 7–23)
CALCIUM SERPL-MCNC: 7.9 MG/DL — LOW (ref 8.4–10.5)
CHLORIDE SERPL-SCNC: 99 MMOL/L — SIGNIFICANT CHANGE UP (ref 98–107)
CO2 SERPL-SCNC: 25 MMOL/L — SIGNIFICANT CHANGE UP (ref 22–31)
CREAT SERPL-MCNC: 4.66 MG/DL — HIGH (ref 0.5–1.3)
EOSINOPHIL # BLD AUTO: 0.1 K/UL — SIGNIFICANT CHANGE UP (ref 0–0.5)
EOSINOPHIL NFR BLD AUTO: 3 % — SIGNIFICANT CHANGE UP (ref 0–6)
GLUCOSE BLDC GLUCOMTR-MCNC: 114 MG/DL — HIGH (ref 70–99)
GLUCOSE BLDC GLUCOMTR-MCNC: 167 MG/DL — HIGH (ref 70–99)
GLUCOSE BLDC GLUCOMTR-MCNC: 182 MG/DL — HIGH (ref 70–99)
GLUCOSE BLDC GLUCOMTR-MCNC: 96 MG/DL — SIGNIFICANT CHANGE UP (ref 70–99)
GLUCOSE SERPL-MCNC: 97 MG/DL — SIGNIFICANT CHANGE UP (ref 70–99)
HCT VFR BLD CALC: 22.7 % — LOW (ref 34.5–45)
HEPARIN CF II AG PPP-ACNC: 0.15 — SIGNIFICANT CHANGE UP (ref 0–0.39)
HGB BLD-MCNC: 7.7 G/DL — LOW (ref 11.5–15.5)
IMM GRANULOCYTES NFR BLD AUTO: 0.3 % — SIGNIFICANT CHANGE UP (ref 0–1.5)
LYMPHOCYTES # BLD AUTO: 0.81 K/UL — LOW (ref 1–3.3)
LYMPHOCYTES # BLD AUTO: 24.4 % — SIGNIFICANT CHANGE UP (ref 13–44)
MAGNESIUM SERPL-MCNC: 2 MG/DL — SIGNIFICANT CHANGE UP (ref 1.6–2.6)
MCHC RBC-ENTMCNC: 32.4 PG — SIGNIFICANT CHANGE UP (ref 27–34)
MCHC RBC-ENTMCNC: 33.9 % — SIGNIFICANT CHANGE UP (ref 32–36)
MCV RBC AUTO: 95.4 FL — SIGNIFICANT CHANGE UP (ref 80–100)
MONOCYTES # BLD AUTO: 0.46 K/UL — SIGNIFICANT CHANGE UP (ref 0–0.9)
MONOCYTES NFR BLD AUTO: 13.9 % — SIGNIFICANT CHANGE UP (ref 2–14)
NEUTROPHILS # BLD AUTO: 1.93 K/UL — SIGNIFICANT CHANGE UP (ref 1.8–7.4)
NEUTROPHILS NFR BLD AUTO: 58.1 % — SIGNIFICANT CHANGE UP (ref 43–77)
NRBC # FLD: 0 K/UL — SIGNIFICANT CHANGE UP (ref 0–0)
PLATELET # BLD AUTO: 62 K/UL — LOW (ref 150–400)
PMV BLD: 12.9 FL — SIGNIFICANT CHANGE UP (ref 7–13)
POTASSIUM SERPL-MCNC: 4.1 MMOL/L — SIGNIFICANT CHANGE UP (ref 3.5–5.3)
POTASSIUM SERPL-SCNC: 4.1 MMOL/L — SIGNIFICANT CHANGE UP (ref 3.5–5.3)
RBC # BLD: 2.38 M/UL — LOW (ref 3.8–5.2)
RBC # FLD: 13.3 % — SIGNIFICANT CHANGE UP (ref 10.3–14.5)
SODIUM SERPL-SCNC: 136 MMOL/L — SIGNIFICANT CHANGE UP (ref 135–145)
WBC # BLD: 3.32 K/UL — LOW (ref 3.8–10.5)
WBC # FLD AUTO: 3.32 K/UL — LOW (ref 3.8–10.5)

## 2020-11-20 PROCEDURE — 93970 EXTREMITY STUDY: CPT | Mod: 26

## 2020-11-20 PROCEDURE — 99233 SBSQ HOSP IP/OBS HIGH 50: CPT

## 2020-11-20 PROCEDURE — 99233 SBSQ HOSP IP/OBS HIGH 50: CPT | Mod: GC

## 2020-11-20 RX ORDER — ERYTHROPOIETIN 10000 [IU]/ML
4000 INJECTION, SOLUTION INTRAVENOUS; SUBCUTANEOUS
Refills: 0 | Status: DISCONTINUED | OUTPATIENT
Start: 2020-11-20 | End: 2020-11-24

## 2020-11-20 RX ADMIN — Medication 667 MILLIGRAM(S): at 10:14

## 2020-11-20 RX ADMIN — Medication 667 MILLIGRAM(S): at 12:04

## 2020-11-20 RX ADMIN — SODIUM CHLORIDE 3 MILLILITER(S): 9 INJECTION INTRAMUSCULAR; INTRAVENOUS; SUBCUTANEOUS at 21:59

## 2020-11-20 RX ADMIN — CHLORHEXIDINE GLUCONATE 1 APPLICATION(S): 213 SOLUTION TOPICAL at 12:03

## 2020-11-20 RX ADMIN — Medication 667 MILLIGRAM(S): at 18:14

## 2020-11-20 RX ADMIN — ERYTHROPOIETIN 4000 UNIT(S): 10000 INJECTION, SOLUTION INTRAVENOUS; SUBCUTANEOUS at 08:10

## 2020-11-20 RX ADMIN — ATORVASTATIN CALCIUM 20 MILLIGRAM(S): 80 TABLET, FILM COATED ORAL at 21:59

## 2020-11-20 RX ADMIN — SODIUM CHLORIDE 3 MILLILITER(S): 9 INJECTION INTRAMUSCULAR; INTRAVENOUS; SUBCUTANEOUS at 14:28

## 2020-11-20 RX ADMIN — SODIUM CHLORIDE 3 MILLILITER(S): 9 INJECTION INTRAMUSCULAR; INTRAVENOUS; SUBCUTANEOUS at 05:02

## 2020-11-20 NOTE — PROGRESS NOTE ADULT - SUBJECTIVE AND OBJECTIVE BOX
Patient is a 88y old  Female who presents with a chief complaint of missed HD, Hyperkalemia, (20 Nov 2020 07:26)      SUBJECTIVE / OVERNIGHT EVENTS:  patient seen today. She has no pain. She is eating well.     MEDICATIONS  (STANDING):  atorvastatin 20 milliGRAM(s) Oral at bedtime  calcium acetate 667 milliGRAM(s) Oral three times a day with meals  chlorhexidine 4% Liquid 1 Application(s) Topical daily  dextrose 40% Gel 15 Gram(s) Oral once  dextrose 50% Injectable 25 Gram(s) IV Push once  dextrose 50% Injectable 12.5 Gram(s) IV Push once  dextrose 50% Injectable 25 Gram(s) IV Push once  epoetin phillip-epbx (RETACRIT) Injectable 4000 Unit(s) IV Push <User Schedule>  glucagon  Injectable 1 milliGRAM(s) IntraMuscular once  influenza   Vaccine 0.5 milliLiter(s) IntraMuscular once  sodium chloride 0.9% lock flush 3 milliLiter(s) IV Push every 8 hours    MEDICATIONS  (PRN):      Vital Signs Last 24 Hrs  T(C): 36.8 (20 Nov 2020 10:00), Max: 37.1 (19 Nov 2020 21:03)  T(F): 98.2 (20 Nov 2020 10:00), Max: 98.8 (19 Nov 2020 21:03)  HR: 60 (20 Nov 2020 10:00) (60 - 60)  BP: 110/64 (20 Nov 2020 10:00) (104/53 - 111/60)  BP(mean): --  RR: 16 (20 Nov 2020 10:00) (16 - 18)  SpO2: 100% (20 Nov 2020 05:01) (99% - 100%)  CAPILLARY BLOOD GLUCOSE      POCT Blood Glucose.: 182 mg/dL (20 Nov 2020 12:14)  POCT Blood Glucose.: 114 mg/dL (20 Nov 2020 07:57)  POCT Blood Glucose.: 96 mg/dL (20 Nov 2020 05:59)  POCT Blood Glucose.: 116 mg/dL (19 Nov 2020 23:46)  POCT Blood Glucose.: 155 mg/dL (19 Nov 2020 18:20)    I&O's Summary    19 Nov 2020 07:01  -  20 Nov 2020 07:00  --------------------------------------------------------  IN: 0 mL / OUT: 50 mL / NET: -50 mL    20 Nov 2020 07:01  -  20 Nov 2020 14:50  --------------------------------------------------------  IN: 400 mL / OUT: 1900 mL / NET: -1500 mL        Constitutional: elderly female in bed in NAD, awake and alert  EYES: PERRLA, normal sclera  Neck: Soft and supple , no JVD   Respiratory: no respiratory distress, Breath sounds are clear bilaterally. No wheezing, rales or rhonchi  Cardiovascular: S1 and S2, regular rate and rhythm, no murmurs. +LE edema   Gastrointestinal: Soft, nontender, nondistended. +BS  Skin: No rashes, No erythema, warm to touch  Psych: Alert and Oriented x3, normal mood, normal affect    LABS:                        7.7    3.32  )-----------( 62       ( 20 Nov 2020 06:55 )             22.7     11-20    136  |  99  |  29<H>  ----------------------------<  97  4.1   |  25  |  4.66<H>    Ca    7.9<L>      20 Nov 2020 06:55  Mg     2.0     11-20

## 2020-11-20 NOTE — PROGRESS NOTE ADULT - SUBJECTIVE AND OBJECTIVE BOX
[facility] DIVISION OF KIDNEY DISEASES AND HYPERTENSION -- FOLLOW UP NOTE  --------------------------------------------------------------------------------  If any questions, please feel free to contact me  NS pager: [phone], [facility]: [phone]  Edmundo Obrien M.D.  Nephrology Fellow    (After 5 pm or on weekends please page the on-call fellow)  --------------------------------------------------------------------------------    Chief Complaint:  Patient is a 88y old  Female who presents with a chief complaint of missed HD, Hyperkalemia, (19 Nov 2020 10:12)    24 hour events/subjective:  Patient seen and examined at bedside this am, schedule for HD - currently resting comfortable in bed, denies any acute complaints. Vital/labs/imaging reviewed.     PAST HISTORY  --------------------------------------------------------------------------------  No significant changes to PMH, PSH, FHx, SHx, unless otherwise noted    ALLERGIES & MEDICATIONS  --------------------------------------------------------------------------------  Allergies    No Known Allergies    Intolerances      Standing Inpatient Medications  atorvastatin 20 milliGRAM(s) Oral at bedtime  calcium acetate 667 milliGRAM(s) Oral three times a day with meals  chlorhexidine 4% Liquid 1 Application(s) Topical daily  dextrose 40% Gel 15 Gram(s) Oral once  dextrose 50% Injectable 25 Gram(s) IV Push once  dextrose 50% Injectable 12.5 Gram(s) IV Push once  dextrose 50% Injectable 25 Gram(s) IV Push once  glucagon  Injectable 1 milliGRAM(s) IntraMuscular once  influenza   Vaccine 0.5 milliLiter(s) IntraMuscular once  sodium chloride 0.9% lock flush 3 milliLiter(s) IV Push every 8 hours    PRN Inpatient Medications      REVIEW OF SYSTEMS  --------------------------------------------------------------------------------  Gen: No fevers/chills  Skin: No rashes  Respiratory: No dyspnea, cough  CV: No chest pain  GI: No abdominal pain, diarrhea  : No dysuria, hematuria  MSK: No  edema    All other systems were reviewed and are negative, except as noted.    VITALS/PHYSICAL EXAM  --------------------------------------------------------------------------------  T(C): 36.8 (11-20-20 @ 05:01), Max: 37.1 (11-19-20 @ 09:07)  HR: 60 (11-20-20 @ 05:01) (60 - 65)  BP: 104/53 (11-20-20 @ 05:01) (100/52 - 111/60)  RR: 18 (11-20-20 @ 05:01) (18 - 18)  SpO2: 100% (11-20-20 @ 05:01) (99% - 100%)  Wt(kg): --        11-19-20 @ 07:01  -  11-20-20 @ 07:00  --------------------------------------------------------  IN: 0 mL / OUT: 50 mL / NET: -50 mL        Physical Exam:  	Gen: NAD, cooperative  	Pulm: CTA B/L, no wheezing, no rales  	CV: S1S2, RRR  	Abd: Soft, +BS, NT, ND  	Ext: No LE edema B/L  	Neuro: Awake, follows commands   	Skin: Warm and dry  	Vascular access: right sided tunneled cath       LABS/STUDIES  --------------------------------------------------------------------------------              7.8    3.64  >-----------<  50       [11-19-20 @ 05:14]              22.9     135  |  99  |  20  ----------------------------<  93      [11-19-20 @ 05:14]  3.9   |  25  |  3.38        Ca     7.9     [11-19-20 @ 05:14]      Mg     2.0     [11-19-20 @ 05:14]    TPro  5.8  /  Alb  3.9  /  TBili  0.4  /  DBili  x   /  AST  46  /  ALT  48  /  AlkPhos  77  [11-18-20 @ 12:00]              [11-18-20 @ 12:00]    Creatinine Trend:  SCr 3.38 [11-19 @ 05:14]  SCr 5.57 [11-18 @ 12:00]  SCr 5.42 [11-18 @ 07:25]  SCr 4.07 [11-17 @ 05:26]  SCr 7.29 [11-16 @ 19:36]    Urinalysis - [11-17-20 @ 20:00]      Color YELLOW / Appearance Lt TURBID / SG 1.014 / pH 6.0      Gluc 70 / Ketone NEGATIVE  / Bili NEGATIVE / Urobili NORMAL       Blood TRACE / Protein 50 / Leuk Est TRACE / Nitrite NEGATIVE      RBC 6-10 / WBC 6-10 / Hyaline 1+ / Gran  / Sq Epi MODERATE / Non Sq Epi  / Bacteria MODERATE      Iron 61, TIBC 145, %sat --      [11-17-20 @ 05:26]  Ferritin 843.8      [11-17-20 @ 05:26]  .8 (Ca --)      [11-17-20 @ 05:26]   --  Vitamin D (25OH) 44.7      [11-17-20 @ 05:26]  TSH 2.85      [11-17-20 @ 05:26]  Lipid: chol 106, , HDL 29, LDL 69      [11-17-20 @ 05:26]    HBsAb >1000.0      [11-16-20 @ 21:40]  HBsAg Nonreactive      [11-17-20 @ 05:26]  HBcAb Reactive      [11-16-20 @ 21:40]  HCV 0.06, Nonreactive Hepatitis C AB  S/CO Ratio                        Interpretation  < 1.00                                   Non-Reactive  1.00 - 4.99                         Weakly-Reactive  >= 5.00                                Reactive  Non-Reactive: Aperson with a non-reactive HCV antibody  result is considered uninfected.  No further action is  needed unless recent infection is suspected.  In these  cases, consider repeat testing later to detect  seroconversion..  Weakly-Reactive: HCV antibody test is abnormal, HCV RNA  Qualitative test will follow.  Reactive: HCV antibody test is abnormal, HCV RNA  Qualitative test will follow.  Note: HCV antibody testing is performed on the [employer]   system.      [11-17-20 @ 05:26]  HIV Nonreactive The HIV Ag/Ab Combo test performed screens for HIV-1 p24  antigen, antibodies to HIV-1 (group M and group O), and  antibodies to HIV-2. All specimens repeatedly reactive  will reflex to an HIV 1/2 antibody confirmation and  differentiation test. This assay detects p24 antigen which  may be present prior to the development of HIV antibodies,  therefore a reactive result with a negative HIV 1/2 AB  Confirmation should be followed up with HIV-1 RNA, HIV-2  RNA and repeat testing in 4-8 weeks. A nonreactive result  does not preclude previous exposure to or infection with  HIV-1 or HIV-2.      [11-19-20 @ 05:14]

## 2020-11-20 NOTE — PROGRESS NOTE ADULT - PROBLEM SELECTOR PLAN 1
Pt. with ESRD on HD three times a week. Last HD was about 3 weeks before admission via right tunneled cath. Unknown dialysis center -Pt. clinically stable. Labs reviewed. Will arrange for maintenance HD today. Monitor labs. Adjust medications to HD. Pt. with ESRD on HD three times a week. Last HD was about 3 weeks before admission via right tunneled cath. Unknown dialysis center -Pt. clinically stable. Labs reviewed. Will arrange for maintenance HD today. Monitor labs. Adjust medications to HD. Consider vascular eval of left arm AVF

## 2020-11-20 NOTE — PROGRESS NOTE ADULT - PROBLEM SELECTOR PLAN 2
Admission K of 6.4 due to missed HD  x  1 month   EKG- AV paced  Hyperkalemia resolved   S/P IV Ca Gluconate, D50 and regular Insulin , Lokelma  10 gm po x 1  Received HD   monitor BMP s/p HD.  Renal on board

## 2020-11-20 NOTE — PROGRESS NOTE ADULT - PROBLEM SELECTOR PLAN 5
Patient with hyperphosphatemia - pth 883 - c/w phosphate binders with meals.  Low phosphorus diet.  Monitor serum phosphorus.

## 2020-11-20 NOTE — PROGRESS NOTE ADULT - PROBLEM SELECTOR PLAN 1
Platelets dropped to 50, now increasing   No signs of active bleeding   Reviewed outpt records ( Records in chart) - Platelet count was 267 on 6/12/20  Appreciate- heme consult-Unclear cause. could be related to HD as platelets can stick to polysulfone membrane. Not consistent with TMA/TTP/HIT.   Peripheral smear with on average 7-8 platelets/HPF consistent with a plt count of 70-80, no schistocytes, RBC morphology normal, normal WBC morphology  US LE Doppler,HIV, HIT panel pending

## 2020-11-20 NOTE — CHART NOTE - NSCHARTNOTEFT_GEN_A_CORE
Patients HIT antibody negative. SUAD pending.   Platelets increased today. US LE doppler negative, UE doppler pending.   Low suspicion for HIT.   Will continue to monitor platelet count.     Aden Peter MD   Hematology/Oncology Fellow   pager [phone]  After 5pm and on weekends page on call fellow

## 2020-11-20 NOTE — PROGRESS NOTE ADULT - PROBLEM SELECTOR PLAN 4
Patient with anemia in the setting of ESRD. Hemoglobin below target range  Monitor hemoglobin. will start retacrit 4K TIW

## 2020-11-21 LAB
ANION GAP SERPL CALC-SCNC: 12 MMO/L — SIGNIFICANT CHANGE UP (ref 7–14)
BUN SERPL-MCNC: 18 MG/DL — SIGNIFICANT CHANGE UP (ref 7–23)
CALCIUM SERPL-MCNC: 8.1 MG/DL — LOW (ref 8.4–10.5)
CHLORIDE SERPL-SCNC: 99 MMOL/L — SIGNIFICANT CHANGE UP (ref 98–107)
CO2 SERPL-SCNC: 26 MMOL/L — SIGNIFICANT CHANGE UP (ref 22–31)
CREAT SERPL-MCNC: 3.64 MG/DL — HIGH (ref 0.5–1.3)
GLUCOSE BLDC GLUCOMTR-MCNC: 103 MG/DL — HIGH (ref 70–99)
GLUCOSE BLDC GLUCOMTR-MCNC: 107 MG/DL — HIGH (ref 70–99)
GLUCOSE BLDC GLUCOMTR-MCNC: 108 MG/DL — HIGH (ref 70–99)
GLUCOSE BLDC GLUCOMTR-MCNC: 112 MG/DL — HIGH (ref 70–99)
GLUCOSE SERPL-MCNC: 93 MG/DL — SIGNIFICANT CHANGE UP (ref 70–99)
HCT VFR BLD CALC: 25.9 % — LOW (ref 34.5–45)
HGB BLD-MCNC: 8.3 G/DL — LOW (ref 11.5–15.5)
MAGNESIUM SERPL-MCNC: 1.9 MG/DL — SIGNIFICANT CHANGE UP (ref 1.6–2.6)
MCHC RBC-ENTMCNC: 31.4 PG — SIGNIFICANT CHANGE UP (ref 27–34)
MCHC RBC-ENTMCNC: 32 % — SIGNIFICANT CHANGE UP (ref 32–36)
MCV RBC AUTO: 98.1 FL — SIGNIFICANT CHANGE UP (ref 80–100)
NRBC # FLD: 0 K/UL — SIGNIFICANT CHANGE UP (ref 0–0)
PHOSPHATE SERPL-MCNC: 3.7 MG/DL — SIGNIFICANT CHANGE UP (ref 2.5–4.5)
PLATELET # BLD AUTO: 71 K/UL — LOW (ref 150–400)
PMV BLD: 13.9 FL — HIGH (ref 7–13)
POTASSIUM SERPL-MCNC: 3.9 MMOL/L — SIGNIFICANT CHANGE UP (ref 3.5–5.3)
POTASSIUM SERPL-SCNC: 3.9 MMOL/L — SIGNIFICANT CHANGE UP (ref 3.5–5.3)
RBC # BLD: 2.64 M/UL — LOW (ref 3.8–5.2)
RBC # FLD: 13.3 % — SIGNIFICANT CHANGE UP (ref 10.3–14.5)
SODIUM SERPL-SCNC: 137 MMOL/L — SIGNIFICANT CHANGE UP (ref 135–145)
WBC # BLD: 3.92 K/UL — SIGNIFICANT CHANGE UP (ref 3.8–10.5)
WBC # FLD AUTO: 3.92 K/UL — SIGNIFICANT CHANGE UP (ref 3.8–10.5)

## 2020-11-21 PROCEDURE — 99233 SBSQ HOSP IP/OBS HIGH 50: CPT

## 2020-11-21 RX ADMIN — SODIUM CHLORIDE 3 MILLILITER(S): 9 INJECTION INTRAMUSCULAR; INTRAVENOUS; SUBCUTANEOUS at 21:38

## 2020-11-21 RX ADMIN — Medication 667 MILLIGRAM(S): at 17:15

## 2020-11-21 RX ADMIN — SODIUM CHLORIDE 3 MILLILITER(S): 9 INJECTION INTRAMUSCULAR; INTRAVENOUS; SUBCUTANEOUS at 14:00

## 2020-11-21 RX ADMIN — CHLORHEXIDINE GLUCONATE 1 APPLICATION(S): 213 SOLUTION TOPICAL at 11:07

## 2020-11-21 RX ADMIN — ATORVASTATIN CALCIUM 20 MILLIGRAM(S): 80 TABLET, FILM COATED ORAL at 21:38

## 2020-11-21 RX ADMIN — SODIUM CHLORIDE 3 MILLILITER(S): 9 INJECTION INTRAMUSCULAR; INTRAVENOUS; SUBCUTANEOUS at 06:53

## 2020-11-21 RX ADMIN — Medication 667 MILLIGRAM(S): at 11:07

## 2020-11-21 NOTE — PROGRESS NOTE ADULT - PROBLEM SELECTOR PLAN 1
now improving   No signs of active bleeding   Platelet count was 267 on 6/12/20  Appreciate heme consult  - low suspicion for TTP  US LE Doppler negative, HIV negative,  HIT panel, BUE dopplers pending

## 2020-11-21 NOTE — PROGRESS NOTE ADULT - PROBLEM SELECTOR PLAN 4
on HD  as per daughter, pt was evaluated for AVF and had preliminary procedures performed but pt could not tolerate due to arm pain  will need CM help getting dialysis chair as patient does not want to return to previous dialysis center

## 2020-11-21 NOTE — PROGRESS NOTE ADULT - SUBJECTIVE AND OBJECTIVE BOX
Patient is a 88y old  Female who presents with a chief complaint of missed HD, Hyperkalemia, (20 Nov 2020 14:49)      SUBJECTIVE / OVERNIGHT EVENTS:    MEDICATIONS  (STANDING):  atorvastatin 20 milliGRAM(s) Oral at bedtime  calcium acetate 667 milliGRAM(s) Oral three times a day with meals  chlorhexidine 4% Liquid 1 Application(s) Topical daily  dextrose 40% Gel 15 Gram(s) Oral once  dextrose 50% Injectable 25 Gram(s) IV Push once  dextrose 50% Injectable 12.5 Gram(s) IV Push once  dextrose 50% Injectable 25 Gram(s) IV Push once  epoetin phillip-epbx (RETACRIT) Injectable 4000 Unit(s) IV Push <User Schedule>  glucagon  Injectable 1 milliGRAM(s) IntraMuscular once  influenza   Vaccine 0.5 milliLiter(s) IntraMuscular once  sodium chloride 0.9% lock flush 3 milliLiter(s) IV Push every 8 hours    MEDICATIONS  (PRN):      Vital Signs Last 24 Hrs  T(C): 36.7 (21 Nov 2020 13:00), Max: 37.1 (21 Nov 2020 05:50)  T(F): 98 (21 Nov 2020 13:00), Max: 98.7 (21 Nov 2020 05:50)  HR: 60 (21 Nov 2020 13:00) (60 - 70)  BP: 100/54 (21 Nov 2020 13:00) (100/54 - 119/76)  BP(mean): --  RR: 17 (21 Nov 2020 13:00) (14 - 18)  SpO2: 100% (21 Nov 2020 13:00) (99% - 100%)  CAPILLARY BLOOD GLUCOSE      POCT Blood Glucose.: 112 mg/dL (21 Nov 2020 12:49)  POCT Blood Glucose.: 103 mg/dL (21 Nov 2020 05:28)  POCT Blood Glucose.: 167 mg/dL (20 Nov 2020 22:33)    I&O's Summary    20 Nov 2020 07:01  -  21 Nov 2020 07:00  --------------------------------------------------------  IN: 400 mL / OUT: 1900 mL / NET: -1500 mL        Constitutional: elderly female in bed in NAD, awake and alert  EYES: PERRLA, normal sclera  Neck: Soft and supple , no JVD   Respiratory: PermCath on left chest. no respiratory distress, Breath sounds are clear bilaterally. No wheezing, rales or rhonchi  Cardiovascular: S1 and S2, regular rate and rhythm, no murmurs. +LE edema   Gastrointestinal: Soft, nontender, nondistended. +BS  Skin: No rashes, No erythema, warm to touch  Psych: Alert and Oriented x3, normal mood, normal affect      LABS:                        8.3    3.92  )-----------( 71       ( 21 Nov 2020 05:44 )             25.9     11-21    137  |  99  |  18  ----------------------------<  93  3.9   |  26  |  3.64<H>    Ca    8.1<L>      21 Nov 2020 05:44  Phos  3.7     11-21  Mg     1.9     11-21

## 2020-11-22 LAB
ANION GAP SERPL CALC-SCNC: 12 MMO/L — SIGNIFICANT CHANGE UP (ref 7–14)
BUN SERPL-MCNC: 23 MG/DL — SIGNIFICANT CHANGE UP (ref 7–23)
CALCIUM SERPL-MCNC: 7.9 MG/DL — LOW (ref 8.4–10.5)
CHLORIDE SERPL-SCNC: 102 MMOL/L — SIGNIFICANT CHANGE UP (ref 98–107)
CO2 SERPL-SCNC: 24 MMOL/L — SIGNIFICANT CHANGE UP (ref 22–31)
CREAT SERPL-MCNC: 4.79 MG/DL — HIGH (ref 0.5–1.3)
GLUCOSE BLDC GLUCOMTR-MCNC: 108 MG/DL — HIGH (ref 70–99)
GLUCOSE BLDC GLUCOMTR-MCNC: 113 MG/DL — HIGH (ref 70–99)
GLUCOSE BLDC GLUCOMTR-MCNC: 169 MG/DL — HIGH (ref 70–99)
GLUCOSE BLDC GLUCOMTR-MCNC: 88 MG/DL — SIGNIFICANT CHANGE UP (ref 70–99)
GLUCOSE SERPL-MCNC: 80 MG/DL — SIGNIFICANT CHANGE UP (ref 70–99)
HCT VFR BLD CALC: 24.5 % — LOW (ref 34.5–45)
HGB BLD-MCNC: 8.1 G/DL — LOW (ref 11.5–15.5)
MAGNESIUM SERPL-MCNC: 2 MG/DL — SIGNIFICANT CHANGE UP (ref 1.6–2.6)
MCHC RBC-ENTMCNC: 31.9 PG — SIGNIFICANT CHANGE UP (ref 27–34)
MCHC RBC-ENTMCNC: 33.1 % — SIGNIFICANT CHANGE UP (ref 32–36)
MCV RBC AUTO: 96.5 FL — SIGNIFICANT CHANGE UP (ref 80–100)
NRBC # FLD: 0 K/UL — SIGNIFICANT CHANGE UP (ref 0–0)
PHOSPHATE SERPL-MCNC: 4.3 MG/DL — SIGNIFICANT CHANGE UP (ref 2.5–4.5)
PLATELET # BLD AUTO: 86 K/UL — LOW (ref 150–400)
PMV BLD: 13.3 FL — HIGH (ref 7–13)
POTASSIUM SERPL-MCNC: 4 MMOL/L — SIGNIFICANT CHANGE UP (ref 3.5–5.3)
POTASSIUM SERPL-SCNC: 4 MMOL/L — SIGNIFICANT CHANGE UP (ref 3.5–5.3)
RBC # BLD: 2.54 M/UL — LOW (ref 3.8–5.2)
RBC # FLD: 13.4 % — SIGNIFICANT CHANGE UP (ref 10.3–14.5)
SODIUM SERPL-SCNC: 138 MMOL/L — SIGNIFICANT CHANGE UP (ref 135–145)
WBC # BLD: 4 K/UL — SIGNIFICANT CHANGE UP (ref 3.8–10.5)
WBC # FLD AUTO: 4 K/UL — SIGNIFICANT CHANGE UP (ref 3.8–10.5)

## 2020-11-22 PROCEDURE — 99232 SBSQ HOSP IP/OBS MODERATE 35: CPT

## 2020-11-22 PROCEDURE — 90935 HEMODIALYSIS ONE EVALUATION: CPT | Mod: GC

## 2020-11-22 RX ORDER — ERYTHROPOIETIN 10000 [IU]/ML
4000 INJECTION, SOLUTION INTRAVENOUS; SUBCUTANEOUS ONCE
Refills: 0 | Status: COMPLETED | OUTPATIENT
Start: 2020-11-22 | End: 2020-11-22

## 2020-11-22 RX ORDER — LANOLIN ALCOHOL/MO/W.PET/CERES
6 CREAM (GRAM) TOPICAL AT BEDTIME
Refills: 0 | Status: DISCONTINUED | OUTPATIENT
Start: 2020-11-22 | End: 2020-11-24

## 2020-11-22 RX ADMIN — Medication 6 MILLIGRAM(S): at 23:16

## 2020-11-22 RX ADMIN — Medication 667 MILLIGRAM(S): at 17:45

## 2020-11-22 RX ADMIN — ATORVASTATIN CALCIUM 20 MILLIGRAM(S): 80 TABLET, FILM COATED ORAL at 22:23

## 2020-11-22 RX ADMIN — SODIUM CHLORIDE 3 MILLILITER(S): 9 INJECTION INTRAMUSCULAR; INTRAVENOUS; SUBCUTANEOUS at 06:08

## 2020-11-22 RX ADMIN — Medication 667 MILLIGRAM(S): at 12:26

## 2020-11-22 RX ADMIN — ERYTHROPOIETIN 4000 UNIT(S): 10000 INJECTION, SOLUTION INTRAVENOUS; SUBCUTANEOUS at 07:49

## 2020-11-22 RX ADMIN — CHLORHEXIDINE GLUCONATE 1 APPLICATION(S): 213 SOLUTION TOPICAL at 12:27

## 2020-11-22 NOTE — PROGRESS NOTE ADULT - PROBLEM SELECTOR PLAN 3
Patient with anemia in the setting of ESRD. Hemoglobin below target range  Monitor hemoglobin. Started retacrit 4K TIW

## 2020-11-22 NOTE — PROGRESS NOTE ADULT - SUBJECTIVE AND OBJECTIVE BOX
Patient is a 88y old  Female who presents with a chief complaint of missed HD, Hyperkalemia, (22 Nov 2020 09:34)      SUBJECTIVE / OVERNIGHT EVENTS:    MEDICATIONS  (STANDING):  atorvastatin 20 milliGRAM(s) Oral at bedtime  calcium acetate 667 milliGRAM(s) Oral three times a day with meals  chlorhexidine 4% Liquid 1 Application(s) Topical daily  dextrose 40% Gel 15 Gram(s) Oral once  dextrose 50% Injectable 25 Gram(s) IV Push once  dextrose 50% Injectable 12.5 Gram(s) IV Push once  dextrose 50% Injectable 25 Gram(s) IV Push once  epoetin phillip-epbx (RETACRIT) Injectable 4000 Unit(s) IV Push <User Schedule>  glucagon  Injectable 1 milliGRAM(s) IntraMuscular once  influenza   Vaccine 0.5 milliLiter(s) IntraMuscular once  sodium chloride 0.9% lock flush 3 milliLiter(s) IV Push every 8 hours    MEDICATIONS  (PRN):      Vital Signs Last 24 Hrs  T(C): 36.4 (22 Nov 2020 14:15), Max: 37 (22 Nov 2020 05:35)  T(F): 97.5 (22 Nov 2020 14:15), Max: 98.6 (22 Nov 2020 05:35)  HR: 69 (22 Nov 2020 14:15) (60 - 69)  BP: 92/45 (22 Nov 2020 14:15) (92/45 - 122/66)  BP(mean): --  RR: 18 (22 Nov 2020 14:15) (16 - 18)  SpO2: 100% (22 Nov 2020 14:15) (99% - 100%)  CAPILLARY BLOOD GLUCOSE      POCT Blood Glucose.: 169 mg/dL (22 Nov 2020 15:00)  POCT Blood Glucose.: 108 mg/dL (22 Nov 2020 08:55)  POCT Blood Glucose.: 88 mg/dL (22 Nov 2020 06:18)  POCT Blood Glucose.: 107 mg/dL (21 Nov 2020 23:50)  POCT Blood Glucose.: 108 mg/dL (21 Nov 2020 17:57)    I&O's Summary    22 Nov 2020 07:01  -  22 Nov 2020 15:57  --------------------------------------------------------  IN: 500 mL / OUT: 1900 mL / NET: -1400 mL      Constitutional: elderly female in bed in NAD, awake and alert  EYES: PERRLA, normal sclera  Neck: Soft and supple , no JVD   Respiratory: PermCath on left chest. no respiratory distress, Breath sounds are clear bilaterally. No wheezing, rales or rhonchi  Cardiovascular: S1 and S2, regular rate and rhythm, no murmurs. +LE edema   Gastrointestinal: Soft, nontender, nondistended. +BS  Skin: No rashes, No erythema, warm to touch  Psych: Alert and Oriented x3, normal mood, normal affect    LABS:                        8.1    4.00  )-----------( 86       ( 22 Nov 2020 06:45 )             24.5     11-22    138  |  102  |  23  ----------------------------<  80  4.0   |  24  |  4.79<H>    Ca    7.9<L>      22 Nov 2020 06:45  Phos  4.3     11-22  Mg     2.0     11-22                Care Discussed with Consultants/Other Providers: BRENDA

## 2020-11-22 NOTE — PROGRESS NOTE ADULT - SUBJECTIVE AND OBJECTIVE BOX
[facility] DIVISION OF KIDNEY DISEASES AND HYPERTENSION -- FOLLOW UP NOTE  --------------------------------------------------------------------------------  Jostin Eason   Nephrology Fellow  Pager NS: [phone]/ [facility]: [phone]  (After 5 pm or on weekends please page the on-call fellow)      Patient is a 88y old  Female who presents with a chief complaint of missed HD, Hyperkalemia, (21 Nov 2020 15:05)      24 hour events/subjective: Patient seen and examined at the bedside. Vital signs, labs, medications reviewed. Tolerating HD well. Denies any CP, SOB, fever/chills.         PAST HISTORY  --------------------------------------------------------------------------------  No significant changes to PMH, PSH, FHx, SHx, unless otherwise noted    ALLERGIES & MEDICATIONS  --------------------------------------------------------------------------------  Allergies    No Known Allergies    Intolerances      Standing Inpatient Medications  atorvastatin 20 milliGRAM(s) Oral at bedtime  calcium acetate 667 milliGRAM(s) Oral three times a day with meals  chlorhexidine 4% Liquid 1 Application(s) Topical daily  dextrose 40% Gel 15 Gram(s) Oral once  dextrose 50% Injectable 25 Gram(s) IV Push once  dextrose 50% Injectable 12.5 Gram(s) IV Push once  dextrose 50% Injectable 25 Gram(s) IV Push once  epoetin phillip-epbx (RETACRIT) Injectable 4000 Unit(s) IV Push <User Schedule>  glucagon  Injectable 1 milliGRAM(s) IntraMuscular once  influenza   Vaccine 0.5 milliLiter(s) IntraMuscular once  sodium chloride 0.9% lock flush 3 milliLiter(s) IV Push every 8 hours    PRN Inpatient Medications      REVIEW OF SYSTEMS  --------------------------------------------------------------------------------  Gen: No fevers/chills  Skin: No rashes  Head/Eyes/Ears: Normal hearing, no difficulty seeing  Respiratory: No dyspnea, cough  CV: No chest pain  GI: No abdominal pain, diarrhea  : No dysuria, hematuria  MSK: No  edema      >>> <<<    VITALS/PHYSICAL EXAM  --------------------------------------------------------------------------------  T(C): 37 (11-22-20 @ 05:35), Max: 37 (11-22-20 @ 05:35)  HR: 60 (11-22-20 @ 05:35) (60 - 65)  BP: 122/66 (11-22-20 @ 05:35) (100/54 - 122/66)  RR: 17 (11-22-20 @ 05:35) (16 - 18)  SpO2: 99% (11-22-20 @ 05:35) (99% - 100%)  Wt(kg): --        Physical Exam:    	Gen: NAD  	HEENT: Anicteric  	Pulm: CTA B/L  	CV: S1S2  	Abd: Soft, +BS   	MSK: No LE edema B/L  	Neuro: Awake  	Skin: Warm and dry  	Vascular access: RIJ tunneled HD cath      LABS/STUDIES  --------------------------------------------------------------------------------              8.1    4.00  >-----------<  86       [11-22-20 @ 06:45]              24.5     138  |  102  |  23  ----------------------------<  80      [11-22-20 @ 06:45]  4.0   |  24  |  4.79        Ca     7.9     [11-22-20 @ 06:45]      Mg     2.0     [11-22-20 @ 06:45]      Phos  4.3     [11-22-20 @ 06:45]            Creatinine Trend:  SCr 4.79 [11-22 @ 06:45]  SCr 3.64 [11-21 @ 05:44]  SCr 4.66 [11-20 @ 06:55]  SCr 3.38 [11-19 @ 05:14]  SCr 5.57 [11-18 @ 12:00]    Urinalysis - [11-17-20 @ 20:00]      Color YELLOW / Appearance Lt TURBID / SG 1.014 / pH 6.0      Gluc 70 / Ketone NEGATIVE  / Bili NEGATIVE / Urobili NORMAL       Blood TRACE / Protein 50 / Leuk Est TRACE / Nitrite NEGATIVE      RBC 6-10 / WBC 6-10 / Hyaline 1+ / Gran  / Sq Epi MODERATE / Non Sq Epi  / Bacteria MODERATE      Iron 61, TIBC 145, %sat --      [11-17-20 @ 05:26]  Ferritin 843.8      [11-17-20 @ 05:26]  .8 (Ca --)      [11-17-20 @ 05:26]   --  Vitamin D (25OH) 44.7      [11-17-20 @ 05:26]  TSH 2.85      [11-17-20 @ 05:26]  Lipid: chol 106, , HDL 29, LDL 69      [11-17-20 @ 05:26]    HBsAb >1000.0      [11-16-20 @ 21:40]  HBsAg Nonreactive      [11-17-20 @ 05:26]  HBcAb Reactive      [11-16-20 @ 21:40]  HCV 0.06, Nonreactive Hepatitis C AB  S/CO Ratio                        Interpretation  < 1.00                                   Non-Reactive  1.00 - 4.99                         Weakly-Reactive  >= 5.00                                Reactive  Non-Reactive: Aperson with a non-reactive HCV antibody  result is considered uninfected.  No further action is  needed unless recent infection is suspected.  In these  cases, consider repeat testing later to detect  seroconversion..  Weakly-Reactive: HCV antibody test is abnormal, HCV RNA  Qualitative test will follow.  Reactive: HCV antibody test is abnormal, HCV RNA  Qualitative test will follow.  Note: HCV antibody testing is performed on the [employer]   system.      [11-17-20 @ 05:26]  HIV Nonreactive The HIV Ag/Ab Combo test performed screens for HIV-1 p24  antigen, antibodies to HIV-1 (group M and group O), and  antibodies to HIV-2. All specimens repeatedly reactive  will reflex to an HIV 1/2 antibody confirmation and  differentiation test. This assay detects p24 antigen which  may be present prior to the development of HIV antibodies,  therefore a reactive result with a negative HIV 1/2 AB  Confirmation should be followed up with HIV-1 RNA, HIV-2  RNA and repeat testing in 4-8 weeks. A nonreactive result  does not preclude previous exposure to or infection with  HIV-1 or HIV-2.      [11-19-20 @ 05:14]

## 2020-11-22 NOTE — PROGRESS NOTE ADULT - PROBLEM SELECTOR PLAN 1
Pt. with ESRD on HD three times a week. Last HD was about 3 weeks before admission via right tunneled cath. Unknown dialysis center -Pt. clinically stable. Labs reviewed. Tolerating maintenance HD today. Monitor labs. Dose medications to HD. Consider vascular eval of left arm AVF Pt. with ESRD on HD three times a week. Last HD was about 3 weeks before admission via right tunneled cath. Unknown dialysis center -Labs reviewed. Tolerating maintenance HD today. Monitor labs. Dose medications to HD. Consider vascular eval of left arm AVF

## 2020-11-22 NOTE — PROGRESS NOTE ADULT - PROBLEM SELECTOR PLAN 2
now improving   No signs of active bleeding   Platelet count was 267 on 6/12/20  Appreciate heme consult  - low suspicion for TTP  US LE Doppler negative, HIV negative, HIT test negative  [ ]BUE dopplers pending

## 2020-11-23 ENCOUNTER — TRANSCRIPTION ENCOUNTER (OUTPATIENT)
Age: 85
End: 2020-11-23

## 2020-11-23 LAB
ANION GAP SERPL CALC-SCNC: 12 MMO/L — SIGNIFICANT CHANGE UP (ref 7–14)
ANION GAP SERPL CALC-SCNC: 12 MMO/L — SIGNIFICANT CHANGE UP (ref 7–14)
BUN SERPL-MCNC: 12 MG/DL — SIGNIFICANT CHANGE UP (ref 7–23)
BUN SERPL-MCNC: 12 MG/DL — SIGNIFICANT CHANGE UP (ref 7–23)
CALCIUM SERPL-MCNC: 8.2 MG/DL — LOW (ref 8.4–10.5)
CALCIUM SERPL-MCNC: 8.2 MG/DL — LOW (ref 8.4–10.5)
CHLORIDE SERPL-SCNC: 99 MMOL/L — SIGNIFICANT CHANGE UP (ref 98–107)
CHLORIDE SERPL-SCNC: 99 MMOL/L — SIGNIFICANT CHANGE UP (ref 98–107)
CO2 SERPL-SCNC: 26 MMOL/L — SIGNIFICANT CHANGE UP (ref 22–31)
CO2 SERPL-SCNC: 26 MMOL/L — SIGNIFICANT CHANGE UP (ref 22–31)
CREAT SERPL-MCNC: 3.76 MG/DL — HIGH (ref 0.5–1.3)
CREAT SERPL-MCNC: 3.76 MG/DL — HIGH (ref 0.5–1.3)
GLUCOSE BLDC GLUCOMTR-MCNC: 136 MG/DL — HIGH (ref 70–99)
GLUCOSE BLDC GLUCOMTR-MCNC: 157 MG/DL — HIGH (ref 70–99)
GLUCOSE BLDC GLUCOMTR-MCNC: 88 MG/DL — SIGNIFICANT CHANGE UP (ref 70–99)
GLUCOSE SERPL-MCNC: 86 MG/DL — SIGNIFICANT CHANGE UP (ref 70–99)
GLUCOSE SERPL-MCNC: 86 MG/DL — SIGNIFICANT CHANGE UP (ref 70–99)
HCT VFR BLD CALC: 24.7 % — LOW (ref 34.5–45)
HGB BLD-MCNC: 8 G/DL — LOW (ref 11.5–15.5)
MAGNESIUM SERPL-MCNC: 2 MG/DL — SIGNIFICANT CHANGE UP (ref 1.6–2.6)
MCHC RBC-ENTMCNC: 31.9 PG — SIGNIFICANT CHANGE UP (ref 27–34)
MCHC RBC-ENTMCNC: 32.4 % — SIGNIFICANT CHANGE UP (ref 32–36)
MCV RBC AUTO: 98.4 FL — SIGNIFICANT CHANGE UP (ref 80–100)
NRBC # FLD: 0 K/UL — SIGNIFICANT CHANGE UP (ref 0–0)
PHOSPHATE SERPL-MCNC: 3.6 MG/DL — SIGNIFICANT CHANGE UP (ref 2.5–4.5)
PLATELET # BLD AUTO: 60 K/UL — LOW (ref 150–400)
PMV BLD: 13.3 FL — HIGH (ref 7–13)
POTASSIUM SERPL-MCNC: 4.2 MMOL/L — SIGNIFICANT CHANGE UP (ref 3.5–5.3)
POTASSIUM SERPL-MCNC: 4.2 MMOL/L — SIGNIFICANT CHANGE UP (ref 3.5–5.3)
POTASSIUM SERPL-SCNC: 4.2 MMOL/L — SIGNIFICANT CHANGE UP (ref 3.5–5.3)
POTASSIUM SERPL-SCNC: 4.2 MMOL/L — SIGNIFICANT CHANGE UP (ref 3.5–5.3)
RBC # BLD: 2.51 M/UL — LOW (ref 3.8–5.2)
RBC # FLD: 13.4 % — SIGNIFICANT CHANGE UP (ref 10.3–14.5)
SODIUM SERPL-SCNC: 137 MMOL/L — SIGNIFICANT CHANGE UP (ref 135–145)
SODIUM SERPL-SCNC: 137 MMOL/L — SIGNIFICANT CHANGE UP (ref 135–145)
SRA INTERP SER-IMP: SIGNIFICANT CHANGE UP
SRA INTERP SER-IMP: SIGNIFICANT CHANGE UP
WBC # BLD: 4.36 K/UL — SIGNIFICANT CHANGE UP (ref 3.8–10.5)
WBC # FLD AUTO: 4.36 K/UL — SIGNIFICANT CHANGE UP (ref 3.8–10.5)

## 2020-11-23 PROCEDURE — 99232 SBSQ HOSP IP/OBS MODERATE 35: CPT

## 2020-11-23 RX ORDER — POLYETHYLENE GLYCOL 3350 17 G/17G
17 POWDER, FOR SOLUTION ORAL ONCE
Refills: 0 | Status: COMPLETED | OUTPATIENT
Start: 2020-11-23 | End: 2020-11-23

## 2020-11-23 RX ORDER — METOPROLOL TARTRATE 50 MG
1 TABLET ORAL
Qty: 0 | Refills: 0 | DISCHARGE

## 2020-11-23 RX ORDER — FUROSEMIDE 40 MG
1 TABLET ORAL
Qty: 0 | Refills: 0 | DISCHARGE

## 2020-11-23 RX ORDER — CALCIUM ACETATE 667 MG
1 TABLET ORAL
Qty: 0 | Refills: 0 | DISCHARGE
Start: 2020-11-23

## 2020-11-23 RX ADMIN — Medication 667 MILLIGRAM(S): at 10:00

## 2020-11-23 RX ADMIN — Medication 6 MILLIGRAM(S): at 21:46

## 2020-11-23 RX ADMIN — ATORVASTATIN CALCIUM 20 MILLIGRAM(S): 80 TABLET, FILM COATED ORAL at 21:46

## 2020-11-23 RX ADMIN — POLYETHYLENE GLYCOL 3350 17 GRAM(S): 17 POWDER, FOR SOLUTION ORAL at 02:45

## 2020-11-23 RX ADMIN — CHLORHEXIDINE GLUCONATE 1 APPLICATION(S): 213 SOLUTION TOPICAL at 11:59

## 2020-11-23 NOTE — PROGRESS NOTE ADULT - PROBLEM SELECTOR PLAN 1
- platelets 60 today, drop from 80s yesterday, count was wnl in 06/2020  - no signs or symptoms of bleeding   - appreciate heme input - low suspicion for TTP, HIT negative, SUAD pending, HIV negative, LE duplex negative, heme to review smear  - monitor CBC, tx platelets <10K, <20K if febrile, <50K if bleeding

## 2020-11-23 NOTE — DISCHARGE NOTE PROVIDER - NSDCMRMEDTOKEN_GEN_ALL_CORE_FT
Lasix 80 mg oral tablet: 1 tab(s) orally once a day  metoprolol succinate 25 mg oral tablet, extended release: 1 tab(s) orally once a day  rosuvastatin 5 mg oral capsule: 1 cap(s) orally once a day   calcium acetate 667 mg oral tablet: 1 tab(s) orally 3 times a day  rosuvastatin 5 mg oral capsule: 1 cap(s) orally once a day

## 2020-11-23 NOTE — CHART NOTE - NSCHARTNOTEFT_GEN_A_CORE
Peripheral smear reviewed today: 8-9 Plt per HPF, no schistocytes, no platelet clumping.   Will re-evaluate tomorrow and monitor CBC .     Harrison Lizarraga MD  Hematology/Oncology Fellow

## 2020-11-23 NOTE — DISCHARGE NOTE PROVIDER - NSDCFUSCHEDAPPT_GEN_ALL_CORE_FT
ROSIE BERTRAND ; 01/08/2021 ; [facility] [id number]  ROSIE BERTRAND ; 01/08/2021 ; [facility] [phone]

## 2020-11-23 NOTE — PROGRESS NOTE ADULT - PROBLEM SELECTOR PLAN 2
- on HD TIW via permacath, per daughter, pt was evaluated for AVF and had preliminary procedures performed but pt could not tolerate due to arm pain  - HD per nephrology, appreciate nephro input

## 2020-11-23 NOTE — PROGRESS NOTE ADULT - ASSESSMENT
88F hx of ESRD on HD via permacath, colon cancer s/p hemicolectomy, HTN, who presents with n/v/d, admitted for missed HD and hyperkalemia. Course c/b thrombocytopenia of unclear etiology.

## 2020-11-23 NOTE — DISCHARGE NOTE PROVIDER - CARE PROVIDER_API CALL
Baljinder Covarrubias  INTERNAL MEDICINE  [address], [address]  [place] [address]  Phone: [phone]  Fax: [phone]  Established Patient  Follow Up Time:

## 2020-11-23 NOTE — DISCHARGE NOTE PROVIDER - HOSPITAL COURSE
89 y/o F with hx of HTN, colon ca s/p hemicolectomy, ESRD on HD via permacath, presents with nausea and vomiting and diarrhea x few days. admitted for missed HD, hyperkalemia     ESRD (end stage renal disease) on dialysis.    - on HD  as per daughter, pt was evaluated for AVF and had preliminary procedures performed but pt could not tolerate due to arm pain  will need CM help getting dialysis chair as patient does not want to return to previous dialysis center.     Thrombocytopenia.   - now improving   No signs of active bleeding   Platelet count was 267 on 6/12/20  Appreciate heme consult  - low suspicion for TTP  US LE Doppler negative, HIV negative, HIT test negative  [ ]BUE dopplers pending.     Hyperkalemia.    -secondary to missed HD  x  1 month   improved with dialysis   continue with HD.     Acidosis, metabolic.   - improved.     Anemia.    - Likely Anemia of chronic disease  Monitor H/H  no signs of active bleeding  Iron studies cw AOCD  DW Renal -Plan per Renal.     Hypertension, unspecified type.   - SBP borderline low   Will dc Metoprolol.    Pacemaker.    Appreciate EP consult for PPM interrogation-"Patient's pacemaker battery near [place]. Discussed with Dr. Perez. Will give f/u for the device clinic and with Dr. Perez in 6 weeks. No generator change required at this time".     Need for prophylactic measure.    SCDs  Dispo- pending outpatient dialysis. PT -> home PT.       On 11/23/20, case was discussed with Dr. Ellsworth, patient is medically cleared and optimized for discharge today. All medications were reviewed with attending, and sent to mutually agreed upon pharmacy   87 y/o F with hx of HTN, colon ca s/p hemicolectomy, ESRD on HD via permacath, presents with nausea and vomiting and diarrhea x few days. admitted for missed HD, hyperkalemia     ESRD (end stage renal disease) on dialysis.    - on HD  as per daughter, pt was evaluated for AVF and had preliminary procedures performed but pt could not tolerate due to arm pain  SW has discussed dialysis facility with family.     Thrombocytopenia.   - now improving   No signs of active bleeding   Platelet count was 267 on 6/12/20  Appreciate heme consult  - low suspicion for TTP  US LE Doppler negative, HIV negative, HIT test negative  No need for further testing per hematology     Hyperkalemia.    -secondary to missed HD  x  1 month   improved with dialysis   continue with HD.     Acidosis, metabolic.   - improved.     Anemia.    - Likely Anemia of chronic disease  Monitor H/H  no signs of active bleeding  Iron studies cw AOCD  DW Renal -Plan per Renal.     Hypertension, unspecified type.   - SBP borderline low   Will dc Metoprolol.    Pacemaker.    Appreciate EP consult for PPM interrogation-"Patient's pacemaker battery near [place]. Discussed with Dr. Perez. Will give f/u for the device clinic and with Dr. Perez in 6 weeks. No generator change required at this time".     Need for prophylactic measure.    SCDs  Dispo- pending outpatient dialysis. PT -> home PT.       On 11/23/20, case was discussed with Dr. Ellsworth, patient is medically cleared and optimized for discharge today. All medications were reviewed with attending, and sent to mutually agreed upon pharmacy   89 y/o F with hx of HTN, colon ca s/p hemicolectomy, ESRD on HD via permacath, presents with nausea and vomiting and diarrhea x few days. admitted for missed HD, hyperkalemia     ESRD (end stage renal disease) on dialysis.    - on HD  as per daughter, pt was evaluated for AVF and had preliminary procedures performed but pt could not tolerate due to arm pain  SW has discussed dialysis facility with family.     Thrombocytopenia.   - now improving   No signs of active bleeding   Platelet count was 267 on 6/12/20  Appreciate heme consult  - low suspicion for TTP  US LE Doppler negative, HIV negative, HIT test negative  No need for further testing per hematology     Hyperkalemia.    -secondary to missed HD  x  1 month   improved with dialysis   continue with HD.     Acidosis, metabolic.   - improved.     Anemia.    - Likely Anemia of chronic disease  Monitor H/H  no signs of active bleeding  Iron studies cw AOCD  DW Renal -Plan per Renal.     Hypertension, unspecified type.   - SBP borderline low   metoprolol discontinued, patient to be re-evaluated     Pacemaker.    Appreciate EP consult for PPM interrogation-"Patient's pacemaker battery near [place]. Discussed with Dr. Perez. Will give f/u for the device clinic and with Dr. Perez in 6 weeks. No generator change required at this time".     Need for prophylactic measure.    SCDs  Dispo- pending outpatient dialysis. PT -> home PT.       On 11/23/20, case was discussed with Dr. Ellsworth, patient is medically cleared and optimized for discharge today. All medications were reviewed with attending, and sent to mutually agreed upon pharmacy   87 y/o F with hx of HTN, colon ca s/p hemicolectomy, ESRD on HD via permacath, presents with nausea and vomiting and diarrhea x few days. admitted for missed HD, hyperkalemia     ESRD (end stage renal disease) on dialysis.    - on HD  as per daughter, pt was evaluated for AVF and had preliminary procedures performed but pt could not tolerate due to arm pain  SW has discussed dialysis facility with family.     Thrombocytopenia.   - now improving   No signs of active bleeding   Platelet count was 267 on 6/12/20  Appreciate heme consult  - low suspicion for TTP  US LE Doppler negative, HIV negative, HIT test negative  No need for further testing per hematology     Hyperkalemia.    -secondary to missed HD  x  1 month   improved with dialysis   continue with HD.     Acidosis, metabolic.   - improved.     Anemia.    - Likely Anemia of chronic disease  Monitor H/H  no signs of active bleeding  Iron studies cw AOCD  DW Renal -Plan per Renal.     Hypertension, unspecified type.   - SBP borderline low   metoprolol discontinued, patient to be re-evaluated     Pacemaker.    Appreciate EP consult for PPM interrogation-"Patient's pacemaker battery near [place]. Discussed with Dr. Perez. Will give f/u for the device clinic and with Dr. Perez in 6 weeks. No generator change required at this time".     Need for prophylactic measure.    SCDs  Dispo- pending outpatient dialysis. PT -> home PT.       On 11/24/20, case was discussed with Dr. Ellsworth, patient is medically cleared and optimized for discharge today. All medications were reviewed with attending, and sent to mutually agreed upon pharmacy

## 2020-11-23 NOTE — DISCHARGE NOTE PROVIDER - NSDCCPCAREPLAN_GEN_ALL_CORE_FT
PRINCIPAL DISCHARGE DIAGNOSIS  Diagnosis: Hyperkalemia  Assessment and Plan of Treatment: you have been treated for electrolyte disturbances with hemodialysis.  Please continue as directed per renal with your dialysis treatments.      SECONDARY DISCHARGE DIAGNOSES  Diagnosis: Hypertension, unspecified type  Assessment and Plan of Treatment: Your blood pressure was low while you were in the hospital, so we discontinued the metoprolol medication you were taking.  Please follow up with your provider in 1-2 weeks.

## 2020-11-23 NOTE — PROGRESS NOTE ADULT - PROBLEM SELECTOR PLAN 6
- Appreciate EP consult for PPM interrogation  - Patient's pacemaker battery near [place]. Discussed with Dr. Perez. Will give f/u for the device clinic and with Dr. Perez in 6 weeks. No generator change at this time

## 2020-11-23 NOTE — PROGRESS NOTE ADULT - SUBJECTIVE AND OBJECTIVE BOX
Beverley Ellsworth MD  [facility] Division of Hospital Medicine  Pager [phone] (M-F 8AM-5PM)  Other Times: [phone]    Patient is a 88y old  Female who presents with a chief complaint of missed HD, Hyperkalemia, (23 Nov 2020 10:27)    SUBJECTIVE / OVERNIGHT EVENTS: patient feeling well, asking when she can go home    MEDICATIONS  (STANDING):  atorvastatin 20 milliGRAM(s) Oral at bedtime  calcium acetate 667 milliGRAM(s) Oral three times a day with meals  chlorhexidine 4% Liquid 1 Application(s) Topical daily  dextrose 40% Gel 15 Gram(s) Oral once  dextrose 50% Injectable 25 Gram(s) IV Push once  dextrose 50% Injectable 12.5 Gram(s) IV Push once  dextrose 50% Injectable 25 Gram(s) IV Push once  epoetin pihllip-epbx (RETACRIT) Injectable 4000 Unit(s) IV Push <User Schedule>  glucagon  Injectable 1 milliGRAM(s) IntraMuscular once  influenza   Vaccine 0.5 milliLiter(s) IntraMuscular once  melatonin 6 milliGRAM(s) Oral at bedtime  sodium chloride 0.9% lock flush 3 milliLiter(s) IV Push every 8 hours    MEDICATIONS  (PRN):      PHYSICAL EXAM:  Vital Signs Last 24 Hrs  T(C): 36.6 (23 Nov 2020 12:01), Max: 37.2 (23 Nov 2020 00:46)  T(F): 97.8 (23 Nov 2020 12:01), Max: 99 (23 Nov 2020 00:46)  HR: 73 (23 Nov 2020 12:01) (59 - 88)  BP: 125/83 (23 Nov 2020 12:01) (93/49 - 125/83)  RR: 18 (23 Nov 2020 12:01) (17 - 18)  SpO2: 100% (23 Nov 2020 12:01) (97% - 100%)    CONSTITUTIONAL: NAD, well-developed, well-groomed  RESPIRATORY: Normal respiratory effort; lungs are clear to auscultation bilaterally  CARDIOVASCULAR: Regular rate and rhythm, normal S1 and S2, no murmur/rub/gallop; No lower extremity edema  GASTROINTESTINAL: Nontender to palpation, normoactive bowel sounds, no rebound/guarding; No hepatosplenomegaly  MUSCULOSKELETAL:  no clubbing or cyanosis of digits; no joint swelling or tenderness to palpation  NEUROLOGY: non-focal; no gross sensory deficits   PSYCH: A+O to person, place; affect appropriate  SKIN: No rashes; warm     LABS:                        8.0    4.36  )-----------( 60       ( 23 Nov 2020 07:00 )             24.7     11-23    137  |  99  |  12  ----------------------------<  86  4.2   |  26  |  3.76<H>    Ca    8.2<L>      23 Nov 2020 07:00  Phos  3.6     11-23  Mg     2.0     11-23    RADIOLOGY & ADDITIONAL TESTS:  Results Reviewed:   Imaging Personally Reviewed:  Electrocardiogram Personally Reviewed:    COORDINATION OF CARE:  Care Discussed with Consultants/Other Providers [Y/N]: MK d/w heme fellow Dr. Mickey Castillo, to review peripheral smear  Prior or Outpatient Records Reviewed [Y/N]:

## 2020-11-24 ENCOUNTER — TRANSCRIPTION ENCOUNTER (OUTPATIENT)
Age: 85
End: 2020-11-24

## 2020-11-24 VITALS
TEMPERATURE: 97 F | OXYGEN SATURATION: 100 % | DIASTOLIC BLOOD PRESSURE: 56 MMHG | SYSTOLIC BLOOD PRESSURE: 109 MMHG | HEART RATE: 76 BPM | RESPIRATION RATE: 17 BRPM

## 2020-11-24 LAB
ANION GAP SERPL CALC-SCNC: 9 MMO/L — SIGNIFICANT CHANGE UP (ref 7–14)
BUN SERPL-MCNC: 20 MG/DL — SIGNIFICANT CHANGE UP (ref 7–23)
CALCIUM SERPL-MCNC: 8.7 MG/DL — SIGNIFICANT CHANGE UP (ref 8.4–10.5)
CHLORIDE SERPL-SCNC: 100 MMOL/L — SIGNIFICANT CHANGE UP (ref 98–107)
CO2 SERPL-SCNC: 28 MMOL/L — SIGNIFICANT CHANGE UP (ref 22–31)
CREAT SERPL-MCNC: 5.02 MG/DL — HIGH (ref 0.5–1.3)
GLUCOSE BLDC GLUCOMTR-MCNC: 87 MG/DL — SIGNIFICANT CHANGE UP (ref 70–99)
GLUCOSE BLDC GLUCOMTR-MCNC: 87 MG/DL — SIGNIFICANT CHANGE UP (ref 70–99)
GLUCOSE BLDC GLUCOMTR-MCNC: 96 MG/DL — SIGNIFICANT CHANGE UP (ref 70–99)
GLUCOSE SERPL-MCNC: 83 MG/DL — SIGNIFICANT CHANGE UP (ref 70–99)
HCT VFR BLD CALC: 24.8 % — LOW (ref 34.5–45)
HGB BLD-MCNC: 8 G/DL — LOW (ref 11.5–15.5)
MAGNESIUM SERPL-MCNC: 1.9 MG/DL — SIGNIFICANT CHANGE UP (ref 1.6–2.6)
MCHC RBC-ENTMCNC: 31.6 PG — SIGNIFICANT CHANGE UP (ref 27–34)
MCHC RBC-ENTMCNC: 32.3 % — SIGNIFICANT CHANGE UP (ref 32–36)
MCV RBC AUTO: 98 FL — SIGNIFICANT CHANGE UP (ref 80–100)
NRBC # FLD: 0 K/UL — SIGNIFICANT CHANGE UP (ref 0–0)
PHOSPHATE SERPL-MCNC: 3.9 MG/DL — SIGNIFICANT CHANGE UP (ref 2.5–4.5)
PLATELET # BLD AUTO: 79 K/UL — LOW (ref 150–400)
PMV BLD: 13.6 FL — HIGH (ref 7–13)
POTASSIUM SERPL-MCNC: 4.3 MMOL/L — SIGNIFICANT CHANGE UP (ref 3.5–5.3)
POTASSIUM SERPL-SCNC: 4.3 MMOL/L — SIGNIFICANT CHANGE UP (ref 3.5–5.3)
RBC # BLD: 2.53 M/UL — LOW (ref 3.8–5.2)
RBC # FLD: 13.2 % — SIGNIFICANT CHANGE UP (ref 10.3–14.5)
SODIUM SERPL-SCNC: 137 MMOL/L — SIGNIFICANT CHANGE UP (ref 135–145)
WBC # BLD: 3.41 K/UL — LOW (ref 3.8–10.5)
WBC # FLD AUTO: 3.41 K/UL — LOW (ref 3.8–10.5)

## 2020-11-24 PROCEDURE — 90935 HEMODIALYSIS ONE EVALUATION: CPT | Mod: GC

## 2020-11-24 PROCEDURE — 99239 HOSP IP/OBS DSCHRG MGMT >30: CPT

## 2020-11-24 RX ORDER — CALCIUM ACETATE 667 MG
1 TABLET ORAL
Qty: 90 | Refills: 0
Start: 2020-11-24 | End: 2020-12-23

## 2020-11-24 RX ADMIN — ERYTHROPOIETIN 4000 UNIT(S): 10000 INJECTION, SOLUTION INTRAVENOUS; SUBCUTANEOUS at 09:04

## 2020-11-24 NOTE — DISCHARGE NOTE NURSING/CASE MANAGEMENT/SOCIAL WORK - PATIENT PORTAL LINK FT
You can access the FollowMyHealth Patient Portal offered by [facility] by registering at the following website: [email or web address]. By joining [facility]’s FollowMyHealth portal, you will also be able to view your health information using other applications (apps) compatible with our system.

## 2020-11-24 NOTE — PROVIDER CONTACT NOTE (OTHER) - SITUATION
3 beats of v tach/ bigeminy
LOW bp twice; @1415 bp 92/45 hr 69,  @1815 bp 93/49 hr 59
Pt admitted to 4S from dialysis. Pt complained of feeling gassy upon arrival to the floor.

## 2020-11-24 NOTE — PROGRESS NOTE ADULT - ATTENDING COMMENTS
ESRD on HD    For HD today, will cont TIW and prn as tolerated
patient evaluated at HD unit today.  Blood flow and BP are acceptable
ESRD on HD    Patient could not get HD yesterday because her BP was too low, will try HD again today.
L AVF at wrist with no thrill  ESRD on HD    Please call vascular to eval L wrist  For HD today via L chest cath
Patient seen and examined, VSS. Labs reviewed. Discussed with hematology fellow spencer Hurley Chi to discharge with outpatient followup. Patient stable for discharge with resumption of HD outpatient, follow up with nephrology. Total 40 minutes spent on discharge planning.

## 2020-11-24 NOTE — PROGRESS NOTE ADULT - REASON FOR ADMISSION
missed HD, Hyperkalemia,

## 2020-11-24 NOTE — PROVIDER CONTACT NOTE (OTHER) - ASSESSMENT
Patient is at dialysis at this time. As per dialysis RN patient is in no distress, VS within normal limits.
LOW bp twice; @1415 bp 92/45 hr 69,  @1815 bp 93/49 hr 59, asymptomatic
Pt VS WDL. Pt denies dizziness, nausea, vomiting and diarrhea at this time. Pt complains of gassy feeling.

## 2020-11-24 NOTE — PROGRESS NOTE ADULT - PROBLEM SELECTOR PLAN 3
Patient with anemia in the setting of ESRD. Hemoglobin below target range  Monitor hemoglobin. c/w retacrit 4K TIW

## 2020-11-24 NOTE — PROGRESS NOTE ADULT - PROBLEM SELECTOR PLAN 1
Pt. with ESRD on HD three times a week. Last HD was about 3 weeks before admission via right tunneled cath. Unknown dialysis center -Labs reviewed. Tolerating maintenance HD today. Monitor labs. Dose medications to HD.

## 2020-11-24 NOTE — PROGRESS NOTE ADULT - SUBJECTIVE AND OBJECTIVE BOX
[facility] DIVISION OF KIDNEY DISEASES AND HYPERTENSION -- FOLLOW UP NOTE  --------------------------------------------------------------------------------  If any questions, please feel free to contact me  NS pager: [phone], [facility]: [phone]  Edmundo Obrien M.D.  Nephrology Fellow    (After 5 pm or on weekends please page the on-call fellow)  --------------------------------------------------------------------------------    Chief Complaint:  Patient is a 88y old  Female who presents with a chief complaint of missed HD, Hyperkalemia, (23 Nov 2020 14:26)    24 hour events/subjective:  Patient seen and examined at the bedside. Vital signs, labs, medications reviewed. Tolerating HD well. Denies any CP, SOB, fever/chills.      PAST HISTORY  --------------------------------------------------------------------------------  No significant changes to PMH, PSH, FHx, SHx, unless otherwise noted    ALLERGIES & MEDICATIONS  --------------------------------------------------------------------------------  Allergies    No Known Allergies    Intolerances      Standing Inpatient Medications  atorvastatin 20 milliGRAM(s) Oral at bedtime  calcium acetate 667 milliGRAM(s) Oral three times a day with meals  chlorhexidine 4% Liquid 1 Application(s) Topical daily  dextrose 50% Injectable 25 Gram(s) IV Push once  epoetin phillip-epbx (RETACRIT) Injectable 4000 Unit(s) IV Push <User Schedule>  influenza   Vaccine 0.5 milliLiter(s) IntraMuscular once  melatonin 6 milliGRAM(s) Oral at bedtime  sodium chloride 0.9% lock flush 3 milliLiter(s) IV Push every 8 hours    PRN Inpatient Medications      REVIEW OF SYSTEMS  --------------------------------------------------------------------------------  Gen: No fevers/chills  Skin: No rashes  Head/Eyes/Ears: Normal hearing, no difficulty seeing  Respiratory: No dyspnea, cough  CV: No chest pain  GI: No abdominal pain, diarrhea  : No dysuria, hematuria  MSK: No  edema      All other systems were reviewed and are negative, except as noted.    VITALS/PHYSICAL EXAM  --------------------------------------------------------------------------------  T(C): 36.7 (11-24-20 @ 06:35), Max: 36.7 (11-23-20 @ 20:01)  HR: 66 (11-24-20 @ 06:35) (66 - 87)  BP: 108/66 (11-24-20 @ 06:35) (99/56 - 127/72)  RR: 18 (11-24-20 @ 06:35) (17 - 18)  SpO2: 100% (11-24-20 @ 04:01) (99% - 100%)  Wt(kg): --          Physical Exam:  	Gen: NAD  	HEENT: Anicteric  	Pulm: CTA B/L  	CV: S1S2  	Abd: Soft, +BS   	MSK: No LE edema B/L  	Neuro: Awake  	Skin: Warm and dry  	Vascular access: RIJ tunneled HD cath    LABS/STUDIES  --------------------------------------------------------------------------------              8.0    3.41  >-----------<  79       [11-24-20 @ 06:39]              24.8     137  |  99  |  12  ----------------------------<  86      [11-23-20 @ 07:00]  4.2   |  26  |  3.76        Ca     8.2     [11-23-20 @ 07:00]      Mg     2.0     [11-23-20 @ 07:00]      Phos  3.6     [11-23-20 @ 07:00]            Creatinine Trend:  SCr 3.76 [11-23 @ 07:00]  SCr 4.79 [11-22 @ 06:45]  SCr 3.64 [11-21 @ 05:44]  SCr 4.66 [11-20 @ 06:55]  SCr 3.38 [11-19 @ 05:14]    Urinalysis - [11-17-20 @ 20:00]      Color YELLOW / Appearance Lt TURBID / SG 1.014 / pH 6.0      Gluc 70 / Ketone NEGATIVE  / Bili NEGATIVE / Urobili NORMAL       Blood TRACE / Protein 50 / Leuk Est TRACE / Nitrite NEGATIVE      RBC 6-10 / WBC 6-10 / Hyaline 1+ / Gran  / Sq Epi MODERATE / Non Sq Epi  / Bacteria MODERATE      Iron 61, TIBC 145, %sat --      [11-17-20 @ 05:26]  Ferritin 843.8      [11-17-20 @ 05:26]  .8 (Ca --)      [11-17-20 @ 05:26]   --  Vitamin D (25OH) 44.7      [11-17-20 @ 05:26]  TSH 2.85      [11-17-20 @ 05:26]  Lipid: chol 106, , HDL 29, LDL 69      [11-17-20 @ 05:26]    HBsAb >1000.0      [11-16-20 @ 21:40]  HBsAg Nonreactive      [11-17-20 @ 05:26]  HBcAb Reactive      [11-16-20 @ 21:40]  HCV 0.06, Nonreactive Hepatitis C AB  S/CO Ratio                        Interpretation  < 1.00                                   Non-Reactive  1.00 - 4.99                         Weakly-Reactive  >= 5.00                                Reactive  Non-Reactive: Aperson with a non-reactive HCV antibody  result is considered uninfected.  No further action is  needed unless recent infection is suspected.  In these  cases, consider repeat testing later to detect  seroconversion..  Weakly-Reactive: HCV antibody test is abnormal, HCV RNA  Qualitative test will follow.  Reactive: HCV antibody test is abnormal, HCV RNA  Qualitative test will follow.  Note: HCV antibody testing is performed on the [employer]   system.      [11-17-20 @ 05:26]  HIV Nonreactive The HIV Ag/Ab Combo test performed screens for HIV-1 p24  antigen, antibodies to HIV-1 (group M and group O), and  antibodies to HIV-2. All specimens repeatedly reactive  will reflex to an HIV 1/2 antibody confirmation and  differentiation test. This assay detects p24 antigen which  may be present prior to the development of HIV antibodies,  therefore a reactive result with a negative HIV 1/2 AB  Confirmation should be followed up with HIV-1 RNA, HIV-2  RNA and repeat testing in 4-8 weeks. A nonreactive result  does not preclude previous exposure to or infection with  HIV-1 or HIV-2.      [11-19-20 @ 05:14]

## 2020-11-24 NOTE — PROGRESS NOTE ADULT - PROBLEM SELECTOR PLAN 1
- platelets 79 improved form 60 yesterday, count was wnl in 06/2020  - no signs or symptoms of bleeding   - appreciate heme input - low suspicion for TTP, HIT negative, SUAD negative, HIV negative, LE duplex negative, heme reviewed smear  - monitor CBC, tx platelets <10K, <20K if febrile, <50K if bleeding

## 2020-11-24 NOTE — PROGRESS NOTE ADULT - SUBJECTIVE AND OBJECTIVE BOX
Beverley Ellsworth MD  [facility] Division of Hospital Medicine  Pager [phone] (M-F 8AM-5PM)  Other Times: [phone]    Patient is a 88y old  Female who presents with a chief complaint of missed HD, Hyperkalemia, (24 Nov 2020 08:21)    SUBJECTIVE / OVERNIGHT EVENTS: Patient seen after HD, doing well, wants to go home today.     MEDICATIONS  (STANDING):  atorvastatin 20 milliGRAM(s) Oral at bedtime  calcium acetate 667 milliGRAM(s) Oral three times a day with meals  chlorhexidine 4% Liquid 1 Application(s) Topical daily  dextrose 50% Injectable 25 Gram(s) IV Push once  epoetin phillip-epbx (RETACRIT) Injectable 4000 Unit(s) IV Push <User Schedule>  influenza   Vaccine 0.5 milliLiter(s) IntraMuscular once  melatonin 6 milliGRAM(s) Oral at bedtime  sodium chloride 0.9% lock flush 3 milliLiter(s) IV Push every 8 hours    MEDICATIONS  (PRN):      PHYSICAL EXAM:  Vital Signs Last 24 Hrs  T(C): 36.3 (24 Nov 2020 10:37), Max: 36.7 (23 Nov 2020 20:01)  T(F): 97.4 (24 Nov 2020 10:37), Max: 98.1 (24 Nov 2020 00:01)  HR: 76 (24 Nov 2020 10:37) (60 - 87)  BP: 109/56 (24 Nov 2020 10:37) (99/56 - 127/72)  RR: 17 (24 Nov 2020 10:37) (17 - 18)  SpO2: 100% (24 Nov 2020 10:37) (100% - 100%)    CONSTITUTIONAL: NAD, well-developed, well-groomed  RESPIRATORY: Normal respiratory effort; lungs are clear to auscultation bilaterally  CARDIOVASCULAR: Regular rate and rhythm, normal S1 and S2, no murmur/rub/gallop; No lower extremity edema  GASTROINTESTINAL: Nontender to palpation, normoactive bowel sounds, no rebound/guarding; No hepatosplenomegaly  MUSCULOSKELETAL:  no clubbing or cyanosis of digits; no joint swelling or tenderness to palpation  NEUROLOGY: non-focal; no gross sensory deficits   PSYCH: A+O to person, place; affect appropriate  SKIN: No rashes; warm     LABS:                        8.0    3.41  )-----------( 79       ( 24 Nov 2020 06:39 )             24.8     11-24    137  |  100  |  20  ----------------------------<  83  4.3   |  28  |  5.02<H>    Ca    8.7      24 Nov 2020 06:39  Phos  3.9     11-24  Mg     1.9     11-24    RADIOLOGY & ADDITIONAL TESTS:  Results Reviewed:   Imaging Personally Reviewed:  Electrocardiogram Personally Reviewed:    COORDINATION OF CARE:  Care Discussed with Consultants/Other Providers [Y/N]:  Prior or Outpatient Records Reviewed [Y/N]:

## 2020-12-23 ENCOUNTER — APPOINTMENT (OUTPATIENT)
Dept: HEMATOLOGY ONCOLOGY | Facility: CLINIC | Age: 85
End: 2020-12-23

## 2021-01-08 ENCOUNTER — APPOINTMENT (OUTPATIENT)
Dept: ELECTROPHYSIOLOGY | Facility: CLINIC | Age: 86
End: 2021-01-08

## 2021-10-20 NOTE — PATIENT PROFILE ADULT - NSPROPTRIGHTCAREGIVER_GEN_A_NUR
[facility] called asking for referral for patients echo. I made them aware that per referral coordinators annotation the approval was to [facility]. [facility] no

## 2023-07-24 NOTE — PROGRESS NOTE ADULT - ASSESSMENT
Problem: Hemodynamic Instability (Hemodialysis)  Goal: Effective Tissue Perfusion  Outcome: Ongoing, Progressing      89 y/o F with hx of HTN, colon ca s/p hemicolectomy, ESRD on HD via permacath, presents with nausea and vomiting and diarrhea x few days. admitted for missed HD, hyperkalemia

## 2024-01-03 NOTE — H&P ADULT - PROBLEM SELECTOR PLAN 1
1 Admit to tele  check cbc, bmp, a1c, flp, tsh, trend CE  Renal consult appreciated   s/p dextrose, insulin, calcium gluconate and Lokelma. Patient is also undergoing HD now.   monitor BMP s/p HD. Admit to tele  check cbc, bmp, a1c, flp, tsh, free t4, hep panel, fructosamine,   Renal consult appreciated   s/p dextrose, insulin, calcium gluconate and Lokelma. Patient is also undergoing HD now.   monitor BMP s/p HD.  Monitor FS